# Patient Record
Sex: MALE | Race: WHITE | NOT HISPANIC OR LATINO | Employment: FULL TIME | URBAN - METROPOLITAN AREA
[De-identification: names, ages, dates, MRNs, and addresses within clinical notes are randomized per-mention and may not be internally consistent; named-entity substitution may affect disease eponyms.]

---

## 2017-03-24 ENCOUNTER — ANTICOAGULATION THERAPY VISIT (OUTPATIENT)
Dept: NURSING | Facility: CLINIC | Age: 37
End: 2017-03-24
Payer: COMMERCIAL

## 2017-03-24 DIAGNOSIS — I42.9 CARDIOMYOPATHY (H): ICD-10-CM

## 2017-03-24 DIAGNOSIS — Z79.01 LONG TERM CURRENT USE OF ANTICOAGULANT THERAPY: ICD-10-CM

## 2017-03-24 LAB — INR POINT OF CARE: 5 (ref 0.86–1.14)

## 2017-03-24 PROCEDURE — 99207 ZZC NO CHARGE NURSE ONLY: CPT

## 2017-03-24 PROCEDURE — 36416 COLLJ CAPILLARY BLOOD SPEC: CPT

## 2017-03-24 PROCEDURE — 85610 PROTHROMBIN TIME: CPT | Mod: QW

## 2017-03-24 RX ORDER — WARFARIN SODIUM 10 MG/1
10 TABLET ORAL DAILY
Qty: 90 TABLET | Refills: 0 | Status: SHIPPED | OUTPATIENT
Start: 2017-03-24 | End: 2017-07-25

## 2017-03-24 NOTE — MR AVS SNAPSHOT
Naman Alvarado   3/24/2017 8:00 AM   Anticoagulation Therapy Visit    Description:  36 year old male   Provider:  CHERELLE ANTI COAG   Department:  [facility]           INR as of 3/24/2017     Today's INR 5.0!      Anticoagulation Summary as of 3/24/2017     INR goal 2.0-3.0    Today's INR 5.0!    Full instructions 3/25: Hold; 3/26: 5 mg; Otherwise 10 mg every day    Next INR check 3/31/2017      Your next Anticoagulation Clinic appointment(s)     Mar 31, 2017 11:30 AM CDT   An[facility]agulation [facility] CHERELLE ANTI COAG   [facility]    [address] [address] [phone]              Contact Numbers     [facility]  Please call [phone] to cancel and/or reschedule your appointment   Please call [phone] with any problems or questions regarding your therapy.        March 2017 Details    Sun Mon Tue Wed Thu Fri Sat        1               2               3               4                 5               6               7               8               9               10               11                 12               13               14               15               16               17               18                 19               20               21               22               23               24      10 mg   See details      25      Hold           26      5 mg         27      10 mg         28      10 mg         29      10 mg         30      10 mg         31              Date Details   03/24 This INR check       Date of next INR:  3/31/2017         How to take your warfarin dose     To take:  5 mg Take 0.5 of a 10 mg tablet.    To take:  10 mg Take 1 of the 10 mg tablets.    Hold Do not take your warfarin dose. See the Details table to the right for additional instructions.

## 2017-03-24 NOTE — PROGRESS NOTES
ANTICOAGULATION FOLLOW-UP CLINIC VISIT    Patient Name:  Naman Alvarado  Date:  3/24/2017  Contact Type:  Face to Face    SUBJECTIVE:     Patient Findings     Positives No Problem Findings    Comments Pt. Has not had INR checked since August. Has been taking his warfain 10 mg daily.  Adjusted dose, made follow up appointment for 3/31/17.           OBJECTIVE    INR Protime   Date Value Ref Range Status   03/24/2017 5.0 (A) 0.86 - 1.14 Final       ASSESSMENT / PLAN  INR assessment SUPRA    Recheck INR In: 1 WEEK    INR Location Clinic      Anticoagulation Summary as of 3/24/2017     INR goal 2.0-3.0    Today's INR 5.0!    Maintenance plan 10 mg (10 mg x 1) every day    Full instructions 3/25: Hold; 3/26: 5 mg; Otherwise 10 mg every day    Weekly total 70 mg    Weekly max dose 70 mg    Plan last modified Natasha Marcus, RN (3/24/2017)    Next INR check 3/31/2017    Target end date       Anticoagulation Episode Summary     [facility]    Preferred lab     Send INR reminders to  [facility]    Comments       Anticoagulation Care Providers     Provider Role Specialty Phone number    Parth Cloud MD Responsible Internal Medicine [phone]            See the Encounter Report to view Anticoagulation Flowsheet and Dosing Calendar (Go to Encounters tab in chart review, and find the Anticoagulation Therapy Visit)    Dosage adjustment made based on physician directed care plan. Reviewed both bleeding and clotting signs and symptoms with patient this visit. Pt. has no further questions or concerns.  Will call with any changes to diet, medications, or missed doses at INR line [phone].  Gave refill of warfarin, pt. Reports he is out of medication.    Natasha Marcus, RN

## 2017-03-31 ENCOUNTER — ANTICOAGULATION THERAPY VISIT (OUTPATIENT)
Dept: NURSING | Facility: CLINIC | Age: 37
End: 2017-03-31
Payer: COMMERCIAL

## 2017-03-31 LAB — INR POINT OF CARE: 3 (ref 0.86–1.14)

## 2017-03-31 PROCEDURE — 99207 ZZC NO CHARGE NURSE ONLY: CPT

## 2017-03-31 PROCEDURE — 36416 COLLJ CAPILLARY BLOOD SPEC: CPT

## 2017-03-31 PROCEDURE — 85610 PROTHROMBIN TIME: CPT | Mod: QW

## 2017-03-31 NOTE — MR AVS SNAPSHOT
Naman DMITRY Alvarado   3/31/2017 11:30 AM   Anticoagulation Therapy Visit    Description:  36 year old male   Provider:  CHERELLE ANTI COAG   Department:  [facility]           INR as of 3/31/2017     Today's INR 3.0      Anticoagulation Summary as of 3/31/2017     INR goal 2.0-3.0    Today's INR 3.0    Full instructions 10 mg every day    Next INR check 4/14/2017      Your next Anticoagulation Clinic appointment(s)     Apr 14, 2017 11:30 AM CDT   Anticoagulation Visit with CHERELLE ANTI COAG   [facility]    [address] [address] [phone]              Contact Numbers     [facility]  Please call [phone] to cancel and/or reschedule your appointment   Please call [phone] with any problems or questions regarding your therapy.        March 2017 Details    Sun Mon Tue Wed Thu Fri Sat        1               2               3               4                 5               6               7               8               9               10               11                 12               13               14               15               16               17               18                 19               20               21               22               23               24               25                 26               27               28               29               30               31      10 mg   See details        Date Details   03/31 This INR check               How to take your warfarin dose     To take:  10 mg Take 1 of the 10 mg tablets.           April 2017 Details    Sun Mon Tue Wed Thu Fri Sat           1      10 mg           2      10 mg         3      10 mg         4      10 mg         5      10 mg         6      10 mg         7      10 mg         8      10 mg           9      10 mg         10      10 mg         11      10 mg         12      10 mg         13      10 mg         14            15                 16               17                18               19               20               21               22                 23               24               25               26               27               28               29                 30                      Date Details   No additional details    Date of next INR:  4/14/2017         How to take your warfarin dose     To take:  10 mg Take 1 of the 10 mg tablets.

## 2017-04-14 ENCOUNTER — ANTICOAGULATION THERAPY VISIT (OUTPATIENT)
Dept: NURSING | Facility: CLINIC | Age: 37
End: 2017-04-14
Payer: COMMERCIAL

## 2017-04-14 LAB — INR POINT OF CARE: 4.3 (ref 0.86–1.14)

## 2017-04-14 PROCEDURE — 99207 ZZC NO CHARGE NURSE ONLY: CPT

## 2017-04-14 PROCEDURE — 36416 COLLJ CAPILLARY BLOOD SPEC: CPT

## 2017-04-14 PROCEDURE — 85610 PROTHROMBIN TIME: CPT | Mod: QW

## 2017-04-14 NOTE — PROGRESS NOTES
ANTICOAGULATION FOLLOW-UP CLINIC VISIT    Patient Name:  Naman Alvarado  Date:  4/14/2017  Contact Type:  Face to Face    SUBJECTIVE:     Patient Findings     Positives Extra doses    Comments Accidentally took an extra dose on Tuesday.           OBJECTIVE    INR Protime   Date Value Ref Range Status   04/14/2017 4.3 (A) 0.86 - 1.14 Final       ASSESSMENT / PLAN  INR assessment SUPRA    Recheck INR In: 3 WEEKS    INR Location Clinic      Anticoagulation Summary as of 4/14/2017     INR goal 2.0-3.0    Today's INR 4.3!    Maintenance plan 10 mg (10 mg x 1) every day    Full instructions 4/14: Hold; Otherwise 10 mg every day    Weekly total 70 mg    Weekly max dose 70 mg    Plan last modified Natasha Marcus RN (3/24/2017)    Next INR check 5/5/2017    Target end date       Anticoagulation Episode Summary     [facility]    Preferred lab     Send INR reminders to  [facility]    Comments       Anticoagulation Care Providers     Provider Role Specialty Phone number    Parth Cloud MD Responsible Internal Medicine [phone]            See the Encounter Report to view Anticoagulation Flowsheet and Dosing Calendar (Go to Encounters tab in chart review, and find the Anticoagulation Therapy Visit)    Dosage adjustment made based on physician directed care plan. Reviewed both bleeding and clotting signs and symptoms with patient this visit. Pt. has no further questions or concerns.  Will call with any changes to diet, medications, or missed doses at INR line [phone].      Natasha Marcus, RN

## 2017-04-14 NOTE — MR AVS SNAPSHOT
Naman Alvarado   4/14/2017 11:30 AM   Anticoagulation Therapy Visit    Description:  36 year old male   Provider:  CHERELLE ANTI COAG   Department:  [facility]           INR as of 4/14/2017     Today's INR 4.3!      Anticoagulation Summary as of 4/14/2017     INR goal 2.0-3.0    Today's INR 4.3!    Full instructions 4/14: Hold; Otherwise 10 mg every day    Next INR check 5/5/2017      Your next Anticoagulation Clinic appointment(s)     May 05, 2017 11:30 AM CDT   Anticoagulation Visit with CHERELLE ANTI COAG   [facility]    [address] [place] [id number] [phone]              Contact Numbers     [facility]  Please call [phone] to cancel and/or reschedule your appointment   Please call [phone] with any problems or questions regarding your therapy.        April 2017 Details    Sun Mon Tue Wed Thu Fri Sat           1                 2               3               4               5               6               7               8                 9               10               11               12               13               14      Hold   See details      15      10 mg           16      10 mg         17      10 mg         18      10 mg         19      10 mg         20      10 mg         21      10 mg         22      10 mg           23      10 mg         24      10 mg         25      10 mg         26      10 mg         27      10 mg         28      10 mg         29      10 mg           30      10 mg                Date Details   04/14 This INR check               How to take your warfarin dose     To take:  10 mg Take 1 of the 10 mg tablets.    Hold Do not take your warfarin dose. See the Details table to the right for additional instructions.                May 2017 Details    Sun Mon Tue Wed Thu Fri Sat      1      10 mg         2      10 mg         3      10 mg         4      10 mg         5            6                 7               8                9               10               11               12               13                 14               15               16               17               18               19               20                 21               22               23               24               25               26               27                 28               29               30               31                   Date Details   No additional details    Date of next INR:  5/5/2017         How to take your warfarin dose     To take:  10 mg Take 1 of the 10 mg tablets.

## 2017-05-08 ENCOUNTER — TELEPHONE (OUTPATIENT)
Dept: NURSING | Facility: CLINIC | Age: 37
End: 2017-05-08

## 2017-05-08 NOTE — TELEPHONE ENCOUNTER
Left message for patient to return call to the INR clinic number at [phone].  Natasha Marcus RN

## 2017-05-16 NOTE — TELEPHONE ENCOUNTER
Message left for patient to call the INR Clinic # at [phone] to schedule an INR appointment.    Emilia Hernandez RN - BC

## 2017-05-24 NOTE — TELEPHONE ENCOUNTER
Message left for patient to call the INR Clinic # at [phone] to schedule an INR appointment.     Emilia Hernandez RN - BC

## 2017-07-25 ENCOUNTER — TELEPHONE (OUTPATIENT)
Dept: NURSING | Facility: CLINIC | Age: 37
End: 2017-07-25

## 2017-07-25 ENCOUNTER — ANTICOAGULATION THERAPY VISIT (OUTPATIENT)
Dept: NURSING | Facility: CLINIC | Age: 37
End: 2017-07-25
Payer: COMMERCIAL

## 2017-07-25 DIAGNOSIS — Z79.01 CHRONIC ANTICOAGULATION: ICD-10-CM

## 2017-07-25 DIAGNOSIS — Z79.01 LONG TERM CURRENT USE OF ANTICOAGULANT THERAPY: Primary | ICD-10-CM

## 2017-07-25 DIAGNOSIS — I82.512 CHRONIC DEEP VEIN THROMBOSIS (DVT) OF FEMORAL VEIN OF LEFT LOWER EXTREMITY (H): ICD-10-CM

## 2017-07-25 DIAGNOSIS — Z79.01 LONG TERM CURRENT USE OF ANTICOAGULANT THERAPY: ICD-10-CM

## 2017-07-25 DIAGNOSIS — I42.9 CARDIOMYOPATHY (H): ICD-10-CM

## 2017-07-25 PROBLEM — I82.519: Status: ACTIVE | Noted: 2017-07-25

## 2017-07-25 LAB — INR POINT OF CARE: 1.4 (ref 0.86–1.14)

## 2017-07-25 PROCEDURE — 99207 ZZC NO CHARGE NURSE ONLY: CPT

## 2017-07-25 PROCEDURE — 36416 COLLJ CAPILLARY BLOOD SPEC: CPT

## 2017-07-25 PROCEDURE — 85610 PROTHROMBIN TIME: CPT | Mod: QW

## 2017-07-25 RX ORDER — WARFARIN SODIUM 10 MG/1
10 TABLET ORAL DAILY
Qty: 90 TABLET | Refills: 0 | Status: SHIPPED | OUTPATIENT
Start: 2017-07-25 | End: 2017-12-15

## 2017-07-25 NOTE — PROGRESS NOTES
ANTICOAGULATION FOLLOW-UP CLINIC VISIT    Patient Name:  Naman Alvarado  Date:  7/25/2017  Contact Type:  Face to Face    SUBJECTIVE:     Patient Findings     Positives Missed doses, No Problem Findings           OBJECTIVE    INR Protime   Date Value Ref Range Status   07/25/2017 1.4 (A) 0.86 - 1.14 Final       ASSESSMENT / PLAN  INR assessment SUB    Recheck INR In: 1 WEEK    INR Location Clinic      Anticoagulation Summary as of 7/25/2017     INR goal 2.0-3.0    Today's INR 1.4!    Maintenance plan 10 mg (10 mg x 1) every day    Full instructions 10 mg every day    Weekly total 70 mg    Weekly max dose 70 mg    No change documented Emilia Hernandez, RN    Plan last modified Natasha Marcus RN (3/24/2017)    Next INR check 8/1/2017    Target end date       Anticoagulation Episode Summary     INR check location [facility]    Preferred lab     Send INR reminders to  [facility]    Comments       Anticoagulation Care Providers     Provider Role Specialty Phone number    Parth Cloud MD [facility] Internal Medicine [phone]            See the Encounter Report to view Anticoagulation Flowsheet and Dosing Calendar (Go to Encounters tab in chart review, and find the Anticoagulation Therapy Visit)    Dosage adjustment made based on physician directed care plan.    Emilia Hernandez, NIDA

## 2017-07-25 NOTE — MR AVS SNAPSHOT
Naman DMITRY Alvarado   7/25/2017 4:15 PM   Anticoagulation Therapy Visit    Description:  36 year old male   Provider:  CHERELLE ANTI COAG   Department:  [facility]           INR as of 7/25/2017     Today's INR       Anticoagulation Summary as of 7/25/2017     INR goal 2.0-3.0    Today's INR     Full instructions 10 mg every day    Next INR check 8/1/2017      Your next Anticoagulation Clinic appointment(s)     Jul 25, 2017  4:15 PM CDT   Anticoagulation Visit with CHERELLE ANTI COAG   [facility] ([facility]    [address] [address] [phone]            Aug 01, 2017  4:00 PM CDT   Anticoagulation Visit with CHERELLE ANTI COAG   [facility] ([facility]    [address] [address] [phone]              Contact Numbers     [facility]  Please call [phone] to cancel and/or reschedule your appointment   Please call [phone] with any problems or questions regarding your therapy.        July 2017 Details    Sun Mon Tue Wed u Fri Sat           1                 2               3               4               5               6               7               8                 9               10               11               12               13               14               15                 16               17               18               19               20               21               22                 23               24               25      10 mg   See details      26      10 mg         27      10 mg         28      10 mg         29      10 mg           30      10 mg         31      10 mg               Date Details   07/25 This INR check               How to take your warfarin dose     To take:  10 mg Take 1 of the 10 mg tablets.           August 2017 Details    Sun Mon Tue Wed Thu Fri Sat       1            2               3               4               5                 6               7               8                9               10               11               12                 13               14               15               16               17               18               19                 20               21               22               23               24               25               26                 27               28               29               30               31                  Date Details   No additional details    Date of next INR:  8/1/2017         How to take your warfarin dose     To take:  10 mg Take 1 of the 10 mg tablets.

## 2017-08-01 ENCOUNTER — ANTICOAGULATION THERAPY VISIT (OUTPATIENT)
Dept: NURSING | Facility: CLINIC | Age: 37
End: 2017-08-01
Payer: COMMERCIAL

## 2017-08-01 LAB — INR POINT OF CARE: 2.7 (ref 0.86–1.14)

## 2017-08-01 PROCEDURE — 36416 COLLJ CAPILLARY BLOOD SPEC: CPT

## 2017-08-01 PROCEDURE — 85610 PROTHROMBIN TIME: CPT | Mod: QW

## 2017-08-01 PROCEDURE — 99207 ZZC NO CHARGE NURSE ONLY: CPT

## 2017-08-01 NOTE — MR AVS SNAPSHOT
Naman Alvarado   8/1/2017 4:00 PM   Anticoagulation Therapy Visit    Description:  37 year old male   Provider:  CHERELLE ANTI COAG   Department:  [facility]           INR as of 8/1/2017     Today's INR 2.7      Anticoagulation Summary as of 8/1/2017     INR goal 2.0-3.0    Today's INR 2.7    Full instructions 10 mg every day    Next INR check 8/15/2017      Your next Anticoagulation Clinic appointment(s)     Aug 01, 2017  4:00 PM CDT   Anticoagulation Visit with  ANTI COAG   [facility] ([facility]    [address] [facility] [address] [phone]            Aug 15, 2017  4:15 PM CDT   Anticoagulation Visit with  ANTI COAG   [facility] ([facility]    [address] [address] [phone]              Contact Numbers     [facility]  Please call [phone] to cancel and/or reschedule your appointment   Please call [phone] with any problems or questions regarding your therapy.        August 2017 Details    Sun Mon Tue Wed Thu Fri Sat       1      10 mg   See details      2      10 mg         3      10 mg         4      10 mg         5      10 mg           6      10 mg         7      10 mg         8      10 mg         9      10 mg         10      10 mg         11      10 mg         12      10 mg           13      10 mg         14      10 mg         15            16               17               18               19                 20               21               22               23               24               25               26                 27               28               29               30               31                  Date Details   08/01 This INR check       Date of next INR:  8/15/2017         How to take your warfarin dose     To take:  10 mg Take 1 of the 10 mg tablets.

## 2017-08-01 NOTE — PROGRESS NOTES
ANTICOAGULATION FOLLOW-UP CLINIC VISIT    Patient Name:  Naman Alvarado  Date:  8/1/2017  Contact Type:  Face to Face    SUBJECTIVE:     Patient Findings     Positives No Problem Findings           OBJECTIVE    INR Protime   Date Value Ref Range Status   08/01/2017 2.7 (A) 0.86 - 1.14 Final       ASSESSMENT / PLAN  No question data found.  Anticoagulation Summary as of 8/1/2017     INR goal 2.0-3.0    Today's INR 2.7    Maintenance plan 10 mg (10 mg x 1) every day    Full instructions 10 mg every day    Weekly total 70 mg    Weekly max dose 70 mg    No change documented Emilia Hernandez, RN    Plan last modified Natasha Marcus RN (3/24/2017)    Next INR check 8/15/2017    Target end date       Anticoagulation Episode Summary     INR check location [facility]    Preferred lab     Send INR reminders to  [facility]    Comments       Anticoagulation Care Providers     Provider Role Specialty Phone number    Patrh Cloud MD Responsible Internal Medicine [phone]            See the Encounter Report to view Anticoagulation Flowsheet and Dosing Calendar (Go to Encounters tab in chart review, and find the Anticoagulation Therapy Visit)    Dosage adjustment made based on physician directed care plan.    Emilia Hernandez, NIDA

## 2017-08-15 ENCOUNTER — ANTICOAGULATION THERAPY VISIT (OUTPATIENT)
Dept: NURSING | Facility: CLINIC | Age: 37
End: 2017-08-15
Payer: COMMERCIAL

## 2017-08-15 LAB — INR POINT OF CARE: 1.7 (ref 0.86–1.14)

## 2017-08-15 PROCEDURE — 85610 PROTHROMBIN TIME: CPT | Mod: QW

## 2017-08-15 PROCEDURE — 99207 ZZC NO CHARGE NURSE ONLY: CPT

## 2017-08-15 PROCEDURE — 36416 COLLJ CAPILLARY BLOOD SPEC: CPT

## 2017-08-15 NOTE — PROGRESS NOTES
ANTICOAGULATION FOLLOW-UP CLINIC VISIT    Patient Name:  Naman Alvarado  Date:  8/15/2017  Contact Type:  Face to Face    SUBJECTIVE:        OBJECTIVE    INR Protime   Date Value Ref Range Status   08/15/2017 1.7 (A) 0.86 - 1.14 Final       ASSESSMENT / PLAN  No question data found.  Anticoagulation Summary as of 8/15/2017     INR goal 2.0-3.0    Today's INR 1.7!    Maintenance plan 10 mg (10 mg x 1) every day    Full instructions 8/15: 15 mg; Otherwise 10 mg every day    Weekly total 70 mg    Weekly max dose 70 mg    Plan last modified Natasha Marcus RN (3/24/2017)    Next INR check 8/29/2017    Target end date       Anticoagulation Episode Summary     INR check location [facility]    Preferred lab     Send INR reminders to  [facility]    Comments       Anticoagulation Care Providers     Provider Role Specialty Phone number    Parth Cloud MD [facility] Internal Medicine [phone]            See the Encounter Report to view Anticoagulation Flowsheet and Dosing Calendar (Go to Encounters tab in chart review, and find the Anticoagulation Therapy Visit)    Dosage adjustment made based on physician directed care plan.    Emilia Hernandez, RN

## 2017-08-15 NOTE — MR AVS SNAPSHOT
Namanvenkatesh Alvarado   8/15/2017 4:15 PM   Anticoagulation Therapy Visit    Description:  37 year old male   Provider:  CHERELLE ANTI COAG   Department:  [facility]           INR as of 8/15/2017     Today's INR 1.7!      Anticoagulation Summary as of 8/15/2017     INR goal 2.0-3.0    Today's INR 1.7!    Full instructions 8/15: 15 mg; Otherwise 10 mg every day    Next INR check 8/29/2017      Your next Anticoagulation Clinic appointment(s)     Aug 15, 2017  4:15 PM CDT   Anticoagulation Visit with  ANTI COAG   [facility]    [address] [facility] [address] [phone]            Aug 29, 2017  3:45 PM CDT   Anticoagulation Visit with  ANTI COAG   [facility]    [address] [facility] [address] [phone]              Contact Numbers     [facility]  Please call [phone] to cancel and/or reschedule your appointment   Please call [phone] with any problems or questions regarding your therapy.        August 2017 Details    Sun Mon Tue Wed Thu Fri Sat       1               2               3               4               5                 6               7               8               9               10               11               12                 13               14               15      15 mg   See details      16      10 mg         17      10 mg         18      10 mg         19      10 mg           20      10 mg         21      10 mg         22      10 mg         23      10 mg         24      10 mg         25      10 mg         26      10 mg           27      10 mg         28      10 mg         29            30               31                  Date Details   08/15 This INR check       Date of next INR:  8/29/2017         How to take your warfarin dose     To take:  10 mg Take 1 of the 10 mg tablets.    To take:  15 mg Take 1.5 of the 10 mg tablets.

## 2017-08-30 ENCOUNTER — TELEPHONE (OUTPATIENT)
Dept: INTERNAL MEDICINE | Facility: CLINIC | Age: 37
End: 2017-08-30

## 2017-08-30 NOTE — TELEPHONE ENCOUNTER
Message left for patient to call the INR Clinic # at [phone] to schedule INR appointment. He was a no show for his appointment on 8/29/17     Emilia Hernandez RN - BC

## 2017-08-30 NOTE — LETTER
[facility]  [address]  [place] [address]  [phone]  Dept: [phone]                  Naman Alvarado  [address] [place] [address]                Dear Naman,              Our records show that you are overdue for an INR, your last INR was done on 8/15/17.  We have tried to call you multiple times, but haven't been able to get through.  Please call [phone] to schedule an INR appointment or go to lab to have an INR drawn as soon as you are able.  Also please update your contact information with us if anything has changed.  Please let us know if you have any questions!!            Thank you,        [facility] INR clinic

## 2017-08-30 NOTE — LETTER
[facility] [place]  [address]  [place] [address] [phone]      November 15, 2017      Naman Alvarado  [address]  [place] [address]      Dear Naman Alvarado:    Medical Alert!  Regular follow-up care while on anti-coagulation medication is vital to your health because of your underlying health condition. Your medication can prevent strokes or clots from forming. It also can cause life-threatening bleeding complications IF it is not monitored on a regular basis.    After multiple attempts to contact you by phone and a previous letter we can no longer assume the risk to have you in our coumadin program or provide refills of your medications.    A copy of this letter will be provided to your primary physician and we ask that you please consider scheduling an appointment today by calling [phone] with Dr. Cloud. Your health is important to us and we would like to partner with you for a healthy future.    Sincerely,    Emilia Hernandez RN - LEONILA Parr Anti-coagulation Nurse   [facility]

## 2017-09-06 NOTE — TELEPHONE ENCOUNTER
Message left for patient to call the INR Clinic # at [phone] to schedule INR appointment.     Emilia Hernandez RN - BC

## 2017-09-14 NOTE — TELEPHONE ENCOUNTER
Message left for patient to call the INR Clinic # at [phone] to schedule INR appointment.      Emilia Hernandez RN - BC

## 2017-09-21 NOTE — TELEPHONE ENCOUNTER
Message left for patient to call the INR Clinic # at [phone] to schedule INR appointment.       Emilia Hernandez RN - BC

## 2017-10-02 NOTE — TELEPHONE ENCOUNTER
Message left for patient to call the INR Clinic # at [phone] to schedule INR appointment.       Emilia Hernandez RN - BC

## 2017-10-09 NOTE — TELEPHONE ENCOUNTER
Message left for patient to call the INR Clinic # at [phone] to schedule INR appointment.       Emilia Hernandez RN - BC

## 2017-10-16 NOTE — TELEPHONE ENCOUNTER
Pt has been called 6 times with no response.  Letter drafted and sent to pt.    Justin Rowland RN, BSN

## 2017-11-07 NOTE — TELEPHONE ENCOUNTER
Patient has been non compliant with INR testing. Letter has been sent to patient (10/16/17). Requesting to discharge patient from INR clinic with future follow ups to be with the provider.    Emilia Hernandez RN - BC

## 2017-11-15 ENCOUNTER — ANTICOAGULATION THERAPY VISIT (OUTPATIENT)
Dept: NURSING | Facility: CLINIC | Age: 37
End: 2017-11-15

## 2017-11-15 NOTE — TELEPHONE ENCOUNTER
TC - please print and mail patient letter dated 11/15.    Thank you,  Emilia Hernandez RN - BC

## 2017-12-15 ENCOUNTER — OFFICE VISIT (OUTPATIENT)
Dept: INTERNAL MEDICINE | Facility: CLINIC | Age: 37
End: 2017-12-15
Payer: COMMERCIAL

## 2017-12-15 VITALS
WEIGHT: 238 LBS | TEMPERATURE: 97 F | HEIGHT: 70 IN | DIASTOLIC BLOOD PRESSURE: 78 MMHG | SYSTOLIC BLOOD PRESSURE: 130 MMHG | OXYGEN SATURATION: 98 % | BODY MASS INDEX: 34.07 KG/M2 | HEART RATE: 84 BPM

## 2017-12-15 DIAGNOSIS — Z79.01 LONG TERM CURRENT USE OF ANTICOAGULANT THERAPY: ICD-10-CM

## 2017-12-15 DIAGNOSIS — Z13.6 CARDIOVASCULAR SCREENING; LDL GOAL LESS THAN 160: ICD-10-CM

## 2017-12-15 DIAGNOSIS — Z79.01 CHRONIC ANTICOAGULATION: ICD-10-CM

## 2017-12-15 DIAGNOSIS — Z23 NEED FOR PROPHYLACTIC VACCINATION AGAINST STREPTOCOCCUS PNEUMONIAE (PNEUMOCOCCUS): ICD-10-CM

## 2017-12-15 DIAGNOSIS — Z23 NEED FOR PROPHYLACTIC VACCINATION AND INOCULATION AGAINST INFLUENZA: ICD-10-CM

## 2017-12-15 DIAGNOSIS — I42.9 CARDIOMYOPATHY, UNSPECIFIED TYPE (H): Primary | ICD-10-CM

## 2017-12-15 LAB
ALT SERPL W P-5'-P-CCNC: 40 U/L (ref 0–70)
ANION GAP SERPL CALCULATED.3IONS-SCNC: 12 MMOL/L (ref 3–14)
BASOPHILS # BLD AUTO: 0 10E9/L (ref 0–0.2)
BASOPHILS NFR BLD AUTO: 0.4 %
BUN SERPL-MCNC: 17 MG/DL (ref 7–30)
CALCIUM SERPL-MCNC: 8.8 MG/DL (ref 8.5–10.1)
CHLORIDE SERPL-SCNC: 106 MMOL/L (ref 94–109)
CHOLEST SERPL-MCNC: 237 MG/DL
CO2 SERPL-SCNC: 21 MMOL/L (ref 20–32)
CREAT SERPL-MCNC: 0.96 MG/DL (ref 0.66–1.25)
DIFFERENTIAL METHOD BLD: ABNORMAL
EOSINOPHIL # BLD AUTO: 0.2 10E9/L (ref 0–0.7)
EOSINOPHIL NFR BLD AUTO: 4 %
ERYTHROCYTE [DISTWIDTH] IN BLOOD BY AUTOMATED COUNT: 13.8 % (ref 10–15)
GFR SERPL CREATININE-BSD FRML MDRD: 89 ML/MIN/1.7M2
GLUCOSE SERPL-MCNC: 95 MG/DL (ref 70–99)
HCT VFR BLD AUTO: 39.3 % (ref 40–53)
HDLC SERPL-MCNC: 52 MG/DL
HGB BLD-MCNC: 13.6 G/DL (ref 13.3–17.7)
INR PPP: 1.5 (ref 0.86–1.14)
LDLC SERPL CALC-MCNC: 167 MG/DL
LYMPHOCYTES # BLD AUTO: 1.3 10E9/L (ref 0.8–5.3)
LYMPHOCYTES NFR BLD AUTO: 23 %
MCH RBC QN AUTO: 31.8 PG (ref 26.5–33)
MCHC RBC AUTO-ENTMCNC: 34.6 G/DL (ref 31.5–36.5)
MCV RBC AUTO: 92 FL (ref 78–100)
MONOCYTES # BLD AUTO: 0.6 10E9/L (ref 0–1.3)
MONOCYTES NFR BLD AUTO: 10.4 %
NEUTROPHILS # BLD AUTO: 3.6 10E9/L (ref 1.6–8.3)
NEUTROPHILS NFR BLD AUTO: 62.2 %
NONHDLC SERPL-MCNC: 185 MG/DL
NT-PROBNP SERPL-MCNC: 83 PG/ML (ref 0–125)
PLATELET # BLD AUTO: 250 10E9/L (ref 150–450)
POTASSIUM SERPL-SCNC: 3.8 MMOL/L (ref 3.4–5.3)
RBC # BLD AUTO: 4.28 10E12/L (ref 4.4–5.9)
SODIUM SERPL-SCNC: 139 MMOL/L (ref 133–144)
TRIGL SERPL-MCNC: 88 MG/DL
WBC # BLD AUTO: 5.7 10E9/L (ref 4–11)

## 2017-12-15 PROCEDURE — 85610 PROTHROMBIN TIME: CPT | Performed by: INTERNAL MEDICINE

## 2017-12-15 PROCEDURE — 36415 COLL VENOUS BLD VENIPUNCTURE: CPT | Performed by: INTERNAL MEDICINE

## 2017-12-15 PROCEDURE — 80061 LIPID PANEL: CPT | Performed by: INTERNAL MEDICINE

## 2017-12-15 PROCEDURE — 80048 BASIC METABOLIC PNL TOTAL CA: CPT | Performed by: INTERNAL MEDICINE

## 2017-12-15 PROCEDURE — 85025 COMPLETE CBC W/AUTO DIFF WBC: CPT | Performed by: INTERNAL MEDICINE

## 2017-12-15 PROCEDURE — 84460 ALANINE AMINO (ALT) (SGPT): CPT | Performed by: INTERNAL MEDICINE

## 2017-12-15 PROCEDURE — 83880 ASSAY OF NATRIURETIC PEPTIDE: CPT | Performed by: INTERNAL MEDICINE

## 2017-12-15 PROCEDURE — 99214 OFFICE O/P EST MOD 30 MIN: CPT | Performed by: INTERNAL MEDICINE

## 2017-12-15 RX ORDER — WARFARIN SODIUM 10 MG/1
10 TABLET ORAL DAILY
Qty: 90 TABLET | Refills: 0 | Status: SHIPPED | OUTPATIENT
Start: 2017-12-15 | End: 2018-04-16

## 2017-12-15 ASSESSMENT — PAIN SCALES - GENERAL: PAINLEVEL: NO PAIN (0)

## 2017-12-15 NOTE — MR AVS SNAPSHOT
After Visit Summary   12/15/2017    Naman Alvarado    MRN: [id number]           Patient Information     Date Of Birth          1980        Visit Information        Provider Department      12/15/2017 12:10 PM Parth Cloud MD [facility]        Today's Diagnoses     Cardiomyopathy, unspecified type (H)    -  1    Need for prophylactic vaccination and inoculation against influenza        Need for prophylactic vaccination against Streptococcus pneumoniae (pneumococcus)        Long term current use of anticoagulant therapy        Chronic anticoagulation        CARDIOVASCULAR SCREENING; LDL GOAL LESS THAN 160          Care Instructions    -- I will follow up with you about lab results.     [facility]    If you have any questions regarding to your visit please contact your care team:     Team Pink:   Clinic Hours Telephone Number   Internal Medicine:  Dr. India Harrison NP       7am-7pm  Monday - Thursday   7am-5pm  Fridays  [phone]  (Appointment scheduling available 24/7)    Questions about your visit?  Team Line  [phone]   Urgent Care - [place] and [place] - 11am-9pm Monday-Friday Saturday-Sunday- 9am-5pm   [place] - 5pm-9pm Monday-Friday Saturday-Sunday- 9am-5pm  [phone] - [place]   [phone] - [place]       What options do I have for visits at the clinic other than the traditional office visit?  To expand how we care for you, many of our providers are utilizing electronic visits (e-visits) and telephone visits, when medically appropriate, for interactions with their patients rather than a visit in the clinic.   We also offer nurse visits for many medical concerns. Just like any other service, we will bill your insurance company for this type of visit based on time spent on the phone with your provider. Not all insurance companies cover these visits. Please check with your medical insurance if  "this type of visit is covered. You will be responsible for any charges that are not paid by your insurance.      E-visits via [email or web address]:  generally incur a $35.00 fee.  Telephone visits:  Time spent on the phone: *charged based on time that is spent on the phone in increments of 10 minutes. Estimated cost:   5-10 mins $30.00   11-20 mins. $59.00   21-30 mins. $85.00   Use [email or web address] (secure email communication and access to your chart) to send your primary care provider a message or make an appointment. Ask someone on your Team how to sign up for [employer].    For a Price Quote for your services, please call our Viewabill Line at [phone].    As always, Thank you for trusting us with your health care needs!    Sandhya Soliman MA            Follow-ups after your visit        Who to contact     If you have questions or need follow up information about today's clinic visit or your schedule please contact [facility] directly at [phone].  Normal or non-critical lab and imaging results will be communicated to you by [email or web address]hart, letter or phone within 4 business days after the clinic has received the results. If you do not hear from us within 7 days, please contact the clinic through [email or web address]hart or phone. If you have a critical or abnormal lab result, we will notify you by phone as soon as possible.  Submit refill requests through [address] or call your pharmacy and they will forward the refill request to us. Please allow 3 business days for your refill to be completed.          Additional Information About Your Visit        [facility] [address] Information     [facility] [address] lets you send messages to your doctor, view your test results, renew your prescriptions, schedule appointments and more. To sign up, go to [email or web address] . Click on \"Log in\" on the left side of the screen, which will take you to the Welcome page. Then click on \"Sign up Now\" on the right side of the page.     You will be asked to enter the access " "code listed below, as well as some personal information. Please follow the directions to create your username and password.     Your access code is: [email or web address]  Expires: 3/15/2018 12:42 PM     Your access code will  in 90 days. If you need help or a new code, please call your [facility] clinic or [phone].        Care EveryWhere ID     This is your Care EveryWhere ID. This could be used by other organizations to access your [facility] medical records  [id number]        Your Vitals Were     Pulse Temperature Height Pulse Oximetry BMI (Body Mass Index)       84 97  F (36.1  C) (Oral) 5' 10\" (1.778 m) 98% 34.15 kg/m2        Blood Pressure from Last 3 Encounters:   12/15/17 130/78   16 132/79   14 134/80    Weight from Last 3 Encounters:   12/15/17 238 lb (108 kg)   16 220 lb 12.8 oz (100.2 kg)   14 208 lb (94.3 kg)              We Performed the Following     ALT     BASIC METABOLIC PANEL     CBC with platelets differential     INR     Lipid panel reflex to direct LDL Fasting          Where to get your medicines      [id number] [place] - [address] AT [facility] & [place]  [address], [place] [address]     Phone:  [phone]     warfarin 10 MG tablet          Primary Care Provider Office Phone # Fax #    Parth Cloud [phone] [address] [facility] [address]        Equal Access to Services     [facility] : Hadii nacny payton hadsamuel Soshakir, waaxda luqadaha, qaybta kaalmada avril, ellen irene. So [facility] [phone].    ATENCIÓN: Si habla español, tiene a deluna disposición servicios gratuitos de asistencia lingüística. Llame al [phone].    We comply with applicable federal civil rights laws and [place] laws. We do not discriminate on the basis of race, color, national origin, age, disability, sex, sexual orientation, or gender " identity.            Thank you!     Thank you for choosing [facility]  for your care. Our goal is always to provide you with excellent care. Hearing back from our patients is one way we can continue to improve our services. Please take a few minutes to complete the written survey that you may receive in the mail after your visit with us. Thank you!             Your Updated Medication List - Protect others around you: Learn how to safely use, store and throw away your medicines at www.disposemymeds.org.          This list is accurate as of: 12/15/17 12:42 PM.  Always use your most recent med list.                   Brand Name Dispense Instructions for use Diagnosis    warfarin 10 MG tablet    COUMADIN    90 tablet    Take 1 tablet (10 mg) by mouth daily    Long term current use of anticoagulant therapy, Cardiomyopathy, unspecified type (H)

## 2017-12-15 NOTE — NURSING NOTE
"Chief Complaint   Patient presents with     Recheck Medication     Warfarin. Due for PHQ2       Initial /78  Pulse 84  Temp 97  F (36.1  C) (Oral)  Ht 5' 10\" (1.778 m)  Wt 238 lb (108 kg)  SpO2 98%  BMI 34.15 kg/m2 Estimated body mass index is 34.15 kg/(m^2) as calculated from the following:    Height as of this encounter: 5' 10\" (1.778 m).    Weight as of this encounter: 238 lb (108 kg).  Medication Reconciliation: complete     Pilar Rangel MA       "

## 2017-12-15 NOTE — LETTER
[facility]  [address] [place] [address]    December 27, 2017    Naman Alvarado  [address]  [place] [address]          Dear Naman,    We have tried to reach you by phone, but were unable to do so.    Enclosed is a copy of your results.     1. You need to increase your coumadin from 10 milligrams daily to 10 milligrams 4 days per week and 12.5 milligrams Monday, Wednesday, and Friday schedule.    2. Do you need 2.5 milligram tabs sent in?    3. Schedule an INR follow up (just the labwork is needed) in 2 weeks.    4. Further follow up dependent upon your compliance. I may re-enroll you in the INR clinic if you show willingness to show up.    Results for orders placed or performed in visit on 12/15/17   ALT   Result Value Ref Range    ALT 40 0 - 70 U/L   BASIC METABOLIC PANEL   Result Value Ref Range    Sodium 139 133 - 144 mmol/L    Potassium 3.8 3.4 - 5.3 mmol/L    Chloride 106 94 - 109 mmol/L    Carbon Dioxide 21 20 - 32 mmol/L    Anion Gap 12 3 - 14 mmol/L    Glucose 95 70 - 99 mg/dL    Urea Nitrogen 17 7 - 30 mg/dL    Creatinine 0.96 0.66 - 1.25 mg/dL    GFR Estimate 89 >60 mL/min/1.7m2    GFR Estimate If Black >90 >60 mL/min/1.7m2    Calcium 8.8 8.5 - 10.1 mg/dL   Lipid panel reflex to direct LDL Fasting   Result Value Ref Range    Cholesterol 237 (H) <200 mg/dL    Triglycerides 88 <150 mg/dL    HDL Cholesterol 52 >39 mg/dL    LDL Cholesterol Calculated 167 (H) <100 mg/dL    Non HDL Cholesterol 185 (H) <130 mg/dL   INR   Result Value Ref Range    INR 1.50 (H) 0.86 - 1.14   CBC with platelets differential   Result Value Ref Range    WBC 5.7 4.0 - 11.0 10e9/L    RBC Count 4.28 (L) 4.4 - 5.9 10e12/L    Hemoglobin 13.6 13.3 - 17.7 g/dL    Hematocrit 39.3 (L) 40.0 - 53.0 %    MCV 92 78 - 100 fl    MCH 31.8 26.5 - 33.0 pg    MCHC 34.6 31.5 - 36.5 g/dL    RDW 13.8 10.0 - 15.0 %    Platelet Count 250 150 - 450 10e9/L    Diff Method  Automated Method     % Neutrophils 62.2 %    % Lymphocytes 23.0 %    % Monocytes 10.4 %    % Eosinophils 4.0 %    % Basophils 0.4 %    Absolute Neutrophil 3.6 1.6 - 8.3 10e9/L    Absolute Lymphocytes 1.3 0.8 - 5.3 10e9/L    Absolute Monocytes 0.6 0.0 - 1.3 10e9/L    Absolute Eosinophils 0.2 0.0 - 0.7 10e9/L    Absolute Basophils 0.0 0.0 - 0.2 10e9/L   BNP-N terminal pro   Result Value Ref Range    N-Terminal Pro Bnp 83 0 - 125 pg/mL     If you have any questions or concerns, please call myself or my nurse at [phone].    Sincerely,        Parth Cloud MD/lis

## 2017-12-15 NOTE — PROGRESS NOTES
SUBJECTIVE:   Naman Alvarado is a 37 year old male who presents to clinic today for the following health issues:    Mr. Naman Alvarado is a patient with a strong streak of noncompliant behavior. Due to repeated no show appointments he was eventually discontinued from the inr clinic and finally his coumadin was denied. My last office visit with this patient was close to 2 years ago.    DVT-- Patient is on Coumadin 10 MG. Patient states that he has been trying to keep up with follow up visits in his INR clinic. He notes that following up with the INR clinic has been difficult because he sometimes works shifts that can be either during the day or at night which makes it hard for him to go the the INR clinic during their open hours on particular days.  He states that his INR has not been stable in the last couple of years. He reports  that he was told in 2003 due to the severity of his deep vein thrombosis [ he basically clotted off his entire lower half of his body from the inferior vena cava on downwards ] that he would be on Coumadin for the rest of his life.     He also was diagnosed with an idiopathic dilated cardiomyopathy in 2009 with normal   Coronary arteries and he had follow up with cardiology  For a time but has long since stopped seeing them. He has a well compensated cardiomyopathy which is for all intents and purposes, asymptomatic.    Problem list and histories reviewed & adjusted, as indicated.  Additional history: as documented    Patient Active Problem List   Diagnosis     MEDICAL HISTORY OF -     CARDIOVASCULAR SCREENING; LDL GOAL LESS THAN 160     Chronic anticoagulation     Long term current use of anticoagulant therapy     Poor compliance with medication     Chronic deep vein thrombosis (DVT) of femoral vein of left lower extremity (H)     Cardiomyopathy, unspecified type (H)     Past Surgical History:   Procedure Laterality Date     SURGICAL HISTORY OF -   2003    [facility]       Social History   Substance Use Topics     Smoking status: Former Smoker     Years: 4.00     Quit date: 1/1/2003     Smokeless tobacco: Never Used      Comment: 1 pack per week     Alcohol use Yes      Comment: OCCASIONALLY     Family History   Problem Relation Age of Onset     Hypertension Mother      Neurologic Disorder Father      Alzheimer Disease Maternal Grandmother      HEART DISEASE Paternal Grandfather          Current Outpatient Prescriptions   Medication Sig Dispense Refill     warfarin (COUMADIN) 10 MG tablet Take 1 tablet (10 mg) by mouth daily 90 tablet 0     [DISCONTINUED] warfarin (COUMADIN) 10 MG tablet Take 1 tablet (10 mg) by mouth daily 90 tablet 0     Labs reviewed in EPIC      Reviewed and updated as needed this visit by clinical staffTobacco  Allergies  Meds  Med Hx  Surg Hx  Fam Hx  Soc Hx      Reviewed and updated as needed this visit by Provider         ROS:  Constitutional, HEENT, cardiovascular, pulmonary, GI, , musculoskeletal, neuro, skin, endocrine and psych systems are negative, except as otherwise noted.    This document serves as a record of the services and decisions personally performed and made by Parth Cloud MD. It was created on their behalf by Rita Lincoln, a trained medical scribe. The creation of this document is based the provider's statements to the medical scribe.  Rita Lincoln  December 15, 2017 12:17 PM    OBJECTIVE:   /78  Pulse 84  Temp 97  F (36.1  C) (Oral)  Ht 1.778 m (5' 10\")  Wt 108 kg (238 lb)  SpO2 98%  BMI 34.15 kg/m2  Body mass index is 34.15 kg/(m^2).  GENERAL: healthy, alert and no distress  EYES: Eyes grossly normal to inspection, EOMI, conjunctivae and sclerae normal  SKIN: no suspicious lesions or rashes to visible skin   NEURO: Mentation intact and speech normal  PSYCH: mentation appears normal, affect normal/bright  HEART -  regular rates and rhythm, normal S1 S2, no S3 or S4 and no cardiac murmur "   LUNGS clear to auscultation bilateral      Diagnostic Test Results:  Results for orders placed or performed in visit on 08/15/17   INR point of care   Result Value Ref Range    INR Protime 1.7 (A) 0.86 - 1.14       ASSESSMENT/PLAN:   (I42.9) Cardiomyopathy, unspecified type (H)  (primary encounter diagnosis)  Comment: we reviewed in detail his case. He declines any cardiology  Or echocardiograms, etc. He doesn't see the need. We agreed that , at the very least he needs to see me for annual office visits and I sternly emphasized that he needs occupational therapy be in touch with me via [employer] and he was signed up today. I agreed to  try to adjust patient with INR and if he stays in follow up I will work with him. There may be a time where E-visits will be necessary   Plan: ALT, BASIC METABOLIC PANEL, warfarin (COUMADIN)        10 MG tablet, INR, CBC with platelets         differential        N-Terminal Pro beta natriuretic peptide     (Z23) Need for prophylactic vaccination and inoculation against influenza  Comment: declines   Plan:     (Z23) Need for prophylactic vaccination against Streptococcus pneumoniae (pneumococcus)  Comment: declines   Plan: he will need this at the very least by the age of 67    (Z79.01) Long term current use of anticoagulant therapy  Comment: as detailed above   Plan: warfarin (COUMADIN) 10 MG tablet, INR, CBC with        platelets differential            (Z79.01) Chronic anticoagulation  Comment: as detailed above   Plan: INR, CBC with platelets differential            (Z13.6) CARDIOVASCULAR SCREENING; LDL GOAL LESS THAN 160  Comment: routine screening   Plan: Lipid panel reflex to direct LDL Fasting        Follow up as indicated on results       Patient Instructions     -- I will follow up with you about lab results.     [facility]    If you have any questions regarding to your visit please contact your care team:     Team Pink:   Clinic Hours Telephone Number   Internal  Medicine:  Dr. India Harrison, NP       7am-7pm  Monday - Thursday   7am-5pm  Fridays  [phone]  (Appointment scheduling available 24/7)    Questions about your visit?  Team Line  [phone]   [facility] [place] [facility] - 11am-9pm Monday-Friday Saturday-Sunday- 9am-5pm   [place] - 5pm-9pm Monday-Friday Saturday-Sunday- 9am-5pm  [phone] - [place]   [phone] - [place]       What options do I have for visits at the clinic other than the traditional office visit?  To expand how we care for you, many of our providers are utilizing electronic visits (e-visits) and telephone visits, when medically appropriate, for interactions with their patients rather than a visit in the clinic.   We also offer nurse visits for many medical concerns. Just like any other service, we will bill your insurance company for this type of visit based on time spent on the phone with your provider. Not all insurance companies cover these visits. Please check with your medical insurance if this type of visit is covered. You will be responsible for any charges that are not paid by your insurance.      E-visits via [employer]:  generally incur a $35.00 fee.  Telephone visits:  Time spent on the phone: *charged based on time that is spent on the phone in increments of 10 minutes. Estimated cost:   5-10 mins $30.00   11-20 mins. $59.00   21-30 mins. $85.00   Use [employer]iat (secure email communication and access to your chart) to send your primary care provider a message or make an appointment. Ask someone on your Team how to sign up for [employer].    For a Price Quote for your services, please call our Consumer Price Line at [phone].    As always, Thank you for trusting us with your health care needs!    Sandhya Soliman MA        The information in this document, created by the medical scribe for me, accurately reflects the services I personally performed and the  decisions made by me. I have reviewed and approved this document for accuracy.   MD Parth Hines MD  [facility]

## 2017-12-15 NOTE — PATIENT INSTRUCTIONS
-- I will follow up with you about lab results.     [facility]    If you have any questions regarding to your visit please contact your care team:     Team Pink:   Clinic Hours Telephone Number   Internal Medicine:  Dr. India Harrison, NP       7am-7pm  Monday - Thursday   7am-5pm  Fridays  [phone]  (Appointment scheduling available 24/7)    Questions about your visit?  Team Line  [phone]   Ur[facility] - [place] [facility] - 11am-9pm Monday-Friday Saturday-Sunday- 9am-5pm   [place] - 5pm-9pm Monday-Friday Saturday-Sunday- 9am-5pm  [phone] - [place]   [phone] - [place]       What options do I have for visits at the clinic other than the traditional office visit?  To expand how we care for you, many of our providers are utilizing electronic visits (e-visits) and telephone visits, when medically appropriate, for interactions with their patients rather than a visit in the clinic.   We also offer nurse visits for many medical concerns. Just like any other service, we will bill your insurance company for this type of visit based on time spent on the phone with your provider. Not all insurance companies cover these visits. Please check with your medical insurance if this type of visit is covered. You will be responsible for any charges that are not paid by your insurance.      E-visits via [employer]:  generally incur a $35.00 fee.  Telephone visits:  Time spent on the phone: *charged based on time that is spent on the phone in increments of 10 minutes. Estimated cost:   5-10 mins $30.00   11-20 mins. $59.00   21-30 mins. $85.00   Use [email or web address] (secure email communication and access to your chart) to send your primary care provider a message or make an appointment. Ask someone on your Team how to sign up for [employer].    For a Price Quote for your services, please call our Consumer Price Line at [phone].    As always, Thank you  for trusting us with your health care needs!    Sandhya Soliman MA

## 2017-12-18 NOTE — PROGRESS NOTES
Normal heart failure test.  Normal kidney function. Normal electrolytes. Normal liver blood test. Cholesterol is quite elevated.  I'll have Dr. Cloud get in touch with you when he is back in the office at the end of the week.   Dr. Wan

## 2018-01-04 ENCOUNTER — TELEPHONE (OUTPATIENT)
Dept: INTERNAL MEDICINE | Facility: CLINIC | Age: 38
End: 2018-01-04

## 2018-01-04 NOTE — TELEPHONE ENCOUNTER
Patient was scheduled on the INR schedule for 12:45pm on 1/5/18. Patient is no longer followed by [facility] and needs to follow up with Dr. Cloud for INR/Warfarin management. Scheduled patient with Dr. Cloud for 12:10pm on 1/5/18. Left VM advising patient to call back to RN hotline regarding the need to change his appointment.    Emilia Hernandez RN - BC

## 2018-01-05 ENCOUNTER — OFFICE VISIT (OUTPATIENT)
Dept: INTERNAL MEDICINE | Facility: CLINIC | Age: 38
End: 2018-01-05
Payer: COMMERCIAL

## 2018-01-05 VITALS
SYSTOLIC BLOOD PRESSURE: 130 MMHG | RESPIRATION RATE: 18 BRPM | OXYGEN SATURATION: 98 % | HEIGHT: 70 IN | DIASTOLIC BLOOD PRESSURE: 88 MMHG | WEIGHT: 238.2 LBS | HEART RATE: 81 BPM | BODY MASS INDEX: 34.1 KG/M2 | TEMPERATURE: 96.5 F

## 2018-01-05 DIAGNOSIS — I82.512 CHRONIC DEEP VEIN THROMBOSIS (DVT) OF FEMORAL VEIN OF LEFT LOWER EXTREMITY (H): ICD-10-CM

## 2018-01-05 DIAGNOSIS — Z79.01 LONG TERM CURRENT USE OF ANTICOAGULANT THERAPY: Primary | ICD-10-CM

## 2018-01-05 LAB — INR PPP: 7.6 (ref 0.86–1.14)

## 2018-01-05 PROCEDURE — 36416 COLLJ CAPILLARY BLOOD SPEC: CPT | Performed by: INTERNAL MEDICINE

## 2018-01-05 PROCEDURE — 85610 PROTHROMBIN TIME: CPT | Performed by: INTERNAL MEDICINE

## 2018-01-05 PROCEDURE — 99213 OFFICE O/P EST LOW 20 MIN: CPT | Performed by: INTERNAL MEDICINE

## 2018-01-05 RX ORDER — WARFARIN SODIUM 2.5 MG/1
2.5 TABLET ORAL
Qty: 30 TABLET | Refills: 3 | Status: SHIPPED | OUTPATIENT
Start: 2018-01-05 | End: 2019-07-09 | Stop reason: ALTCHOICE

## 2018-01-05 ASSESSMENT — PAIN SCALES - GENERAL: PAINLEVEL: NO PAIN (0)

## 2018-01-05 NOTE — PROGRESS NOTES
"  SUBJECTIVE:   Naman Alvarado is a 37 year old male who presents to clinic today for the following health issues:    SUBJECTIVE:  Naman Alvarado, a 37 year old male scheduled an appointment to discuss the following issues:     Long term current use of anticoagulant therapy  Chronic deep vein thrombosis (DVT) of femoral vein of left lower extremity (H)    Medical, social, surgical, and family histories reviewed.    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          ROS: 10 point ROS of systems including Constitutional, Eyes, Respiratory, Cardiovascular, Gastroenterology, Genitourinary, Integumentary, Muscularskeletal, Psychiatric were all negative except for pertinent positives noted in my HPI.    OBJECTIVE:  /88 (BP Location: Left arm, Cuff Size: Adult Regular)  Pulse 81  Temp 96.5  F (35.8  C) (Oral)  Resp 18  Ht 5' 10\" (1.778 m)  Wt 238 lb 3.2 oz (108 kg)  SpO2 98%  BMI 34.18 kg/m2  EXAM:  GENERAL APPEARANCE: healthy, alert and no distress  EYES: EOMI,  PERRL  HENT: ear canals and TM's normal and nose and mouth without ulcers or lesions  PSYCH: mentation appears normal and affect normal/bright    ASSESSMENT/PLAN:  (Z79.01) Long term current use of anticoagulant therapy  (primary encounter " diagnosis)  Comment: I saw Mr. Naman Alvarado today out of a desire to get his program of care set up. Due to some history of noncompliant behavior with  Inr clinic he'd been fired from the clinic. His appointment he thought was today at 12:45 when in point of fact that appointment with the inr clinic had been actually cancelled and so the INR / coumadin monitoring nurse placed patient onto my schedule. The patient had , as he says , not known of this situation. I Saturday down with Naman and did my professional best to establish our common goals of therapy. This patient had a history of a severe venous thromboembolic disease event and life long coumadin is planned. 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 Would he be a candidate for The [employer] INRatio  2 PT/INR Monitor ? We are open as a [facility] many more hours then just the inr clinic and for example could he come and have  blood draws by 6:30-7 and this could be at other sites and we can respond to these INRs by the following day ? I had a face to face encounter with Naman as well as with Emilia the the inr clinic RN today and we agreed to work  together to find a solution . For today he had returned for a recheck INR which was 7.6 ! This was a huge upwards jump from the last INR which was sub-therapeutic range at 1.5 and I had increased at that time his coumadin from 10 milligrams 7 days per week to a plan of 12.5 milligrams Monday, Wednesday, and Friday schedule and 10 milligrams the other days. Patient states he did not have 2.5 milligram tabs and so he had chosen to break his 10's into half doses and took these 2 days per week, and acknowledged that he may have double dosed on at least one day. This is a troubling development ! Cut his dose back to nothing more then 10 milligrams a day and recheck INR in 3-7 days . Cautioned about possible  easy bruising and risks reviewed antibody bleeding.  Plan: warfarin (COUMADIN) 2.5 MG tablet, INR           (I82.512) Chronic deep vein thrombosis (DVT) of femoral vein of left lower extremity (H)  Comment: 2.5 milligram tabs provided for potential future needs  Plan: warfarin (COUMADIN) 2.5 MG tablet, INR

## 2018-01-05 NOTE — NURSING NOTE
"Chief Complaint   Patient presents with     Other'       Initial /88 (BP Location: Left arm, Cuff Size: Adult Regular)  Pulse 81  Temp 96.5  F (35.8  C) (Oral)  Resp 18  Ht 5' 10\" (1.778 m)  Wt 238 lb 3.2 oz (108 kg)  SpO2 98%  BMI 34.18 kg/m2 Estimated body mass index is 34.18 kg/(m^2) as calculated from the following:    Height as of this encounter: 5' 10\" (1.778 m).    Weight as of this encounter: 238 lb 3.2 oz (108 kg).  Medication Reconciliation: complete       Key Ortiz CMA    "

## 2018-01-05 NOTE — MR AVS SNAPSHOT
"              After Visit Summary   1/5/2018    Naman Alvarado    MRN: [id number]           Patient Information     Date Of Birth          1980        Visit Information        Provider Department      1/5/2018 12:10 PM Parth Cloud MD [facility]        Today's Diagnoses     Long term current use of anticoagulant therapy    -  1    Chronic deep vein thrombosis (DVT) of femoral vein of left lower extremity (H)           Follow-ups after your visit        Who to contact     If you have questions or need follow up information about today's clinic visit or your schedule please contact [facility] directly at [phone].  Normal or non-critical lab and imaging results will be communicated to you by [employer], letter or phone within 4 business days after the clinic has received the results. If you do not hear from us within 7 days, please contact the clinic through [employer] or phone. If you have a critical or abnormal lab result, we will notify you by phone as soon as possible.  Submit refill requests through [employer] or call your pharmacy and they will forward the refill request to us. Please allow 3 business days for your refill to be completed.          Additional Information About Your Visit        MyChart Information     [employer] gives you secure access to your electronic health record. If you see a primary care provider, you can also send messages to your care team and make appointments. If you have questions, please call your primary care clinic.  If you do not have a primary care provider, please call [phone] and they will assist you.        Care EveryWhere ID     This is your Care EveryWhere ID. This could be used by other organizations to access your [facility] medical records  [id number]        Your Vitals Were     Pulse Temperature Respirations Height Pulse Oximetry BMI (Body Mass Index)    81 96.5  F (35.8  C) (Oral) 18 5' 10\" (1.778 m) 98% 34.18 kg/m2       Blood Pressure " from Last 3 Encounters:   01/05/18 130/88   12/15/17 130/78   02/04/16 132/79    Weight from Last 3 Encounters:   01/05/18 238 lb 3.2 oz (108 kg)   12/15/17 238 lb (108 kg)   02/04/16 220 lb 12.8 oz (100.2 kg)              We Performed the Following     INR          Today's Medication Changes          These changes are accurate as of: 1/5/18 11:59 PM.  If you have any questions, ask your nurse or doctor.               These medicines have changed or have updated prescriptions.        Dose/Directions    * warfarin 10 MG tablet   Commonly known as:  COUMADIN   This may have changed:  Another medication with the same name was added. Make sure you understand how and when to take each.   Used for:  Long term current use of anticoagulant therapy, Cardiomyopathy, unspecified type (H)   Changed by:  Parth Cloud MD        Dose:  10 mg   Take 1 tablet (10 mg) by mouth daily   Quantity:  90 tablet   Refills:  0       * warfarin 2.5 MG tablet   Commonly known as:  COUMADIN   This may have changed:  You were already taking a medication with the same name, and this prescription was added. Make sure you understand how and when to take each.   Used for:  Long term current use of anticoagulant therapy, Chronic deep vein thrombosis (DVT) of femoral vein of left lower extremity (H)   Changed by:  Parth Cloud MD        Dose:  2.5 mg   Take 1 tablet (2.5 mg) by mouth Every Mon, Wed, Fri Morning To accompany coumadin doses with 10 milligram tabs to = 12.5 milligrams Monday, Wednesday, and Friday schedule and 10 milligrams other days   Quantity:  30 tablet   Refills:  3       * Notice:  This list has 2 medication(s) that are the same as other medications prescribed for you. Read the directions carefully, and ask your doctor or other care provider to review them with you.         Where to get your medicines      Some of these will need a paper prescription and others can be bought over the counter.  Ask your nurse if you have  questions.     Bring a paper prescription for each of these medications     warfarin 2.5 MG tablet                Primary Care Provider Office Phone # Fax #    Parth Cloud [phone] [address] [facility] [address]        Equal Access to Services     DIANA CLIFFORD : Hadii nancy ku hadasho Soomaali, waaxda luqadaha, qaybta kaalmada adeegyada, waxdeena bower haylyleandrew kerr francisco jbrittni irene. So [facility] [phone].    ATENCIÓN: Si habla español, tiene a deluna disposición servicios gratuitos de asistencia lingüística. Llame al [phone].    We comply with applicable federal civil rights laws and [place] laws. We do not discriminate on the basis of race, color, national origin, age, disability, sex, sexual orientation, or gender identity.            Thank you!     Thank you for choosing [facility]  for your care. Our goal is always to provide you with excellent care. Hearing back from our patients is one way we can continue to improve our services. Please take a few minutes to complete the written survey that you may receive in the mail after your visit with us. Thank you!             Your Updated Medication List - Protect others around you: Learn how to safely use, store and throw away your medicines at www.disposemymeds.org.          This list is accurate as of: 1/5/18 11:59 PM.  Always use your most recent med list.                   Brand Name Dispense Instructions for use Diagnosis    * warfarin 10 MG tablet    COUMADIN    90 tablet    Take 1 tablet (10 mg) by mouth daily    Long term current use of anticoagulant therapy, Cardiomyopathy, unspecified type (H)       * warfarin 2.5 MG tablet    COUMADIN    30 tablet    Take 1 tablet (2.5 mg) by mouth Every Mon, Wed, Fri Morning To accompany coumadin doses with 10 milligram tabs to = 12.5 milligrams Monday, Wednesday, and Friday schedule and 10 milligrams other days    Long term current use of anticoagulant therapy, Chronic deep  vein thrombosis (DVT) of femoral vein of left lower extremity (H)       * Notice:  This list has 2 medication(s) that are the same as other medications prescribed for you. Read the directions carefully, and ask your doctor or other care provider to review them with you.

## 2018-01-05 NOTE — TELEPHONE ENCOUNTER
Detailed message left on patient's VM advising him that his INR appointment has been rescheduled as he needs to see provider for this.  Verified appointment date and time.  Advised him via VM to call scheduling number to reschedule if appointment today with Dr. Cloud at 12:10PM does not work out  Scheduling number given    Andrea Meek RN

## 2018-01-10 ENCOUNTER — TELEPHONE (OUTPATIENT)
Dept: INTERNAL MEDICINE | Facility: CLINIC | Age: 38
End: 2018-01-10

## 2018-01-10 DIAGNOSIS — Z79.01 CHRONIC ANTICOAGULATION: Primary | ICD-10-CM

## 2018-01-10 DIAGNOSIS — I82.512 CHRONIC DEEP VEIN THROMBOSIS (DVT) OF FEMORAL VEIN OF LEFT LOWER EXTREMITY (H): ICD-10-CM

## 2018-01-10 NOTE — TELEPHONE ENCOUNTER
Patient has been discharged from the [facility] for noncompliance. Per result note by Dr. Cloud on 12/15/17:  1. Patient needs to increase his coumadin from 10 milligrams daily to 10 milligrams 4 days per week and 12.5 milligrams Monday, Wednesday, and Friday schedule   2. See if patient needs 2.5 milligram tabs sent in and if yes, send in 30 pills with refills times 2  3. Schedule an INR follow up [ just the labwork is needed ] in 2 weeks  4. Further follow up dependent upon patient compliance. I may re-enroll patient in inr clinic if he shows willingness to show up.  Multiple attempts were made to contact patient via phone with no success. Letter was sent to patient with follow up instructions. Patient is currently on the [facility] schedule to be seen on 1/12/18. Per note above INR's are being monitored by PCP. Due to discharge from [facility] would need a new referral before he can be seen in [facility].  Emilia Hernandez RN - BC

## 2018-01-12 ENCOUNTER — ANTICOAGULATION THERAPY VISIT (OUTPATIENT)
Dept: NURSING | Facility: CLINIC | Age: 38
End: 2018-01-12
Payer: COMMERCIAL

## 2018-01-12 DIAGNOSIS — Z79.01 LONG TERM CURRENT USE OF ANTICOAGULANT THERAPY: ICD-10-CM

## 2018-01-12 DIAGNOSIS — I82.409 DEEP VEIN THROMBOSIS (DVT) (H): ICD-10-CM

## 2018-01-12 LAB — INR POINT OF CARE: 2.4 (ref 0.86–1.14)

## 2018-01-12 PROCEDURE — 36416 COLLJ CAPILLARY BLOOD SPEC: CPT

## 2018-01-12 PROCEDURE — 85610 PROTHROMBIN TIME: CPT | Mod: QW

## 2018-01-12 PROCEDURE — 99207 ZZC NO CHARGE NURSE ONLY: CPT

## 2018-01-12 NOTE — PROGRESS NOTES
ANTICOAGULATION FOLLOW-UP CLINIC VISIT    Patient Name:  Naman Alvarado  Date:  1/12/2018  Contact Type:  Face to Face    SUBJECTIVE:     Patient Findings     Positives No Problem Findings           OBJECTIVE    INR Protime   Date Value Ref Range Status   01/12/2018 2.4 (A) 0.86 - 1.14 Final       ASSESSMENT / PLAN  INR assessment THER    Recheck INR In: 2 WEEKS    INR Location Clinic      Anticoagulation Summary as of 1/12/2018     INR goal 2.0-3.0   Today's INR 2.4   Maintenance plan 10 mg (10 mg x 1) every day   Full instructions 10 mg every day   Weekly total 70 mg   Plan last modified Emilia Hernandez, RN (1/12/2018)   Next INR check 1/25/2018   Target end date Indefinite    Indications   Long term current use of anticoagulant therapy [Z79.01]  Deep vein thrombosis (DVT) (H) [I82.409] [I82.409]         Anticoagulation Episode Summary     INR check location     Preferred lab     Send INR reminders to [facility]    Comments       Anticoagulation Care Providers     Provider Role Specialty Phone number    Parth Cloud MD [facility] [phone]            See the Encounter Report to view Anticoagulation Flowsheet and Dosing Calendar (Go to Encounters tab in chart review, and find the Anticoagulation Therapy Visit)    Dosage adjustment made based on physician directed care plan.    Emilia Hernandez, NIDA

## 2018-01-12 NOTE — MR AVS SNAPSHOT
Naman DMITRY Alvarado   1/12/2018 12:45 PM   Anticoagulation Therapy Visit    Description:  37 year old male   Provider:  CHERELLE ANTI COAG   Department:  [facility]           INR as of 1/12/2018     Today's INR 2.4      Anticoagulation Summary as of 1/12/2018     INR goal 2.0-3.0   Today's INR 2.4   Full instructions 10 mg every day   Next INR check 1/25/2018    Indications   Long term current use of anticoagulant therapy [Z79.01]  Deep vein thrombosis (DVT) (H) [I82.409] [I82.409]         Your next Anticoagulation Clinic appointment(s)     Jan 25, 2018  4:00 PM CST   Anticoagulation Visit with CHERELLE ANTI COACLINTON   [facility] ([facility]    [address] [facility] [address] [phone]              Contact Numbers     [facility]  Please call [phone] to cancel and/or reschedule your appointment   Please call [phone] with any problems or questions regarding your therapy.        January 2018 Details    Sun Mon Tue Wed Thu Fri Sat      1               2               3               4               5               6                 7               8               9               10               11               12      10 mg   See details      13      10 mg           14      10 mg         15      10 mg         16      10 mg         17      10 mg         18      10 mg         19      10 mg         20      10 mg           21      10 mg         22      10 mg         23      10 mg         24      10 mg         25            26               27                 28               29               30               31                   Date Details   01/12 This INR check       Date of next INR:  1/25/2018         How to take your warfarin dose     To take:  10 mg Take 1 of the 10 mg tablets.

## 2018-01-25 ENCOUNTER — ANTICOAGULATION THERAPY VISIT (OUTPATIENT)
Dept: NURSING | Facility: CLINIC | Age: 38
End: 2018-01-25
Payer: COMMERCIAL

## 2018-01-25 DIAGNOSIS — I82.409 DEEP VEIN THROMBOSIS (DVT) (H): ICD-10-CM

## 2018-01-25 DIAGNOSIS — Z79.01 LONG TERM CURRENT USE OF ANTICOAGULANT THERAPY: ICD-10-CM

## 2018-01-25 LAB — INR POINT OF CARE: 2.1 (ref 0.86–1.14)

## 2018-01-25 PROCEDURE — 36416 COLLJ CAPILLARY BLOOD SPEC: CPT

## 2018-01-25 PROCEDURE — 99207 ZZC NO CHARGE NURSE ONLY: CPT

## 2018-01-25 PROCEDURE — 85610 PROTHROMBIN TIME: CPT | Mod: QW

## 2018-01-25 NOTE — PROGRESS NOTES
ANTICOAGULATION FOLLOW-UP CLINIC VISIT    Patient Name:  Naman Alvarado  Date:  1/25/2018  Contact Type:  Face to Face    SUBJECTIVE:     Patient Findings     Positives No Problem Findings           OBJECTIVE    INR Protime   Date Value Ref Range Status   01/25/2018 2.1 (A) 0.86 - 1.14 Final       ASSESSMENT / PLAN  INR assessment THER    Recheck INR In: 4 WEEKS    INR Location Clinic      Anticoagulation Summary as of 1/25/2018     INR goal 2.0-3.0   Today's INR 2.1   Maintenance plan 10 mg (10 mg x 1) every day   Full instructions 10 mg every day   Weekly total 70 mg   No change documented Emilia Hernandez, RN   Plan last modified Emilia Hernandez RN (1/12/2018)   Next INR check 2/22/2018   Target end date Indefinite    Indications   Long term current use of anticoagulant therapy [Z79.01]  Deep vein thrombosis (DVT) (H) [I82.409] [I82.409]         Anticoagulation Episode Summary     INR check location     Preferred lab     Send INR reminders to [facility]    Comments       Anticoagulation Care Providers     Provider Role Specialty Phone number    Parth Cloud MD [facility] [phone]            See the Encounter Report to view Anticoagulation Flowsheet and Dosing Calendar (Go to Encounters tab in chart review, and find the Anticoagulation Therapy Visit)    Dosage adjustment made based on physician directed care plan.    Emilia Hernandez RN

## 2018-01-25 NOTE — MR AVS SNAPSHOT
Naman DMITRY Alvarado   1/25/2018 4:00 PM   Anticoagulation Therapy Visit    Description:  37 year old male   Provider:  CHERELLE ANTI COAG   Department:  [facility]           INR as of 1/25/2018     Today's INR 2.1      Anticoagulation Summary as of 1/25/2018     INR goal 2.0-3.0   Today's INR 2.1   Full instructions 10 mg every day   Next INR check 2/22/2018    Indications   Long term current use of anticoagulant therapy [Z79.01]  Deep vein thrombosis (DVT) (H) [I82.409] [I82.409]         Your next Anticoagulation Clinic appointment(s)     Jan 25, 2018  4:00 PM CST   Anticoagulation Visit with CHERELLE ANTI COAG   [facility]    [address] [place] [address] [phone]            Feb 22, 2018  4:00 PM CST   Anticoagulation Visit with CHERELLE ANTI COAG   [facility] ([place] [id number] [facility] [address] [phone]              Contact Numbers     [facility]  Please call [phone] to cancel and/or reschedule your appointment   Please call [phone] with any problems or questions regarding your therapy.        January 2018 Details    Sun Mon Tue Wed Thu Fri Sat      1               2               3               4               5               6                 7               8               9               10               11               12               13                 14               15               16               17               18               19               20                 21               22               23               24               25      10 mg   See details      26      10 mg         27      10 mg           28      10 mg         29      10 mg         30      10 mg         31      10 mg             Date Details   01/25 This INR check               How to take your warfarin dose     To take:  10 mg Take 1 of the 10 mg tablets.           February 2018 Details    Sun Mon Tue Wed Thu Fri  Sat         1      10 mg         2      10 mg         3      10 mg           4      10 mg         5      10 mg         6      10 mg         7      10 mg         8      10 mg         9      10 mg         10      10 mg           11      10 mg         12      10 mg         13      10 mg         14      10 mg         15      10 mg         16      10 mg         17      10 mg           18      10 mg         19      10 mg         20      10 mg         21      10 mg         22            23               24                 25               26               27               28                   Date Details   No additional details    Date of next INR:  2/22/2018         How to take your warfarin dose     To take:  10 mg Take 1 of the 10 mg tablets.

## 2018-02-28 ENCOUNTER — ANTICOAGULATION THERAPY VISIT (OUTPATIENT)
Dept: NURSING | Facility: CLINIC | Age: 38
End: 2018-02-28
Payer: COMMERCIAL

## 2018-02-28 DIAGNOSIS — I82.409 DEEP VEIN THROMBOSIS (DVT) (H): ICD-10-CM

## 2018-02-28 DIAGNOSIS — Z79.01 LONG TERM CURRENT USE OF ANTICOAGULANT THERAPY: ICD-10-CM

## 2018-02-28 LAB — INR POINT OF CARE: 3.7 (ref 0.86–1.14)

## 2018-02-28 PROCEDURE — 85610 PROTHROMBIN TIME: CPT | Mod: QW

## 2018-02-28 PROCEDURE — 36416 COLLJ CAPILLARY BLOOD SPEC: CPT

## 2018-02-28 PROCEDURE — 99207 ZZC NO CHARGE NURSE ONLY: CPT

## 2018-02-28 NOTE — MR AVS SNAPSHOT
Naman ANDRES Jenny   2/28/2018 3:30 PM   Anticoagulation Therapy Visit    Description:  37 year old male   Provider:  CHERELLE ANTI COAG   Department:  [facility]           INR as of 2/28/2018     Today's INR 3.7!      Anticoagulation Summary as of 2/28/2018     INR goal 2.0-3.0   Today's INR 3.7!   Full instructions 2/28: Hold; Otherwise 10 mg every day   Next INR check 3/21/2018    Indications   Long term current use of anticoagulant therapy [Z79.01]  Deep vein thrombosis (DVT) (H) [I82.409] [I82.409]         Your next Anticoagulation Clinic appointment(s)     Mar 21, 2018  4:00 PM CDT   Anticoagulation Visit with CHERELLE ANTI JOSE ANTONIO   [facility] ([facility]    [address] [place] [address] [phone]              Contact Numbers     [facility]  Please call [phone] to cancel and/or reschedule your appointment   Please call [phone] with any problems or questions regarding your therapy.        February 2018 Details    Sun Mon Tue Wed Thu Fri Sat         1               2               3                 4               5               6               7               8               9               10                 11               12               13               14               15               16               17                 18               19               20               21               22               23               24                 25               26               27               28      Hold   See details          Date Details   02/28 This INR check               How to take your warfarin dose     Hold Do not take your warfarin dose. See the Details table to the right for additional instructions.                March 2018 Details    Sun Mon Tue Wed Thu Fri Sat         1      10 mg         2      10 mg         3      10 mg           4      10 mg         5      10 mg         6      10 mg         7      10 mg         8      10 mg         9      10 mg          10      10 mg           11      10 mg         12      10 mg         13      10 mg         14      10 mg         15      10 mg         16      10 mg         17      10 mg           18      10 mg         19      10 mg         20      10 mg         21            22               23               24                 25               26               27               28               29               30               31                Date Details   No additional details    Date of next INR:  3/21/2018         How to take your warfarin dose     To take:  10 mg Take 1 of the 10 mg tablets.

## 2018-02-28 NOTE — PROGRESS NOTES
ANTICOAGULATION FOLLOW-UP CLINIC VISIT    Patient Name:  Naman Alvarado  Date:  2/28/2018  Contact Type:  Face to Face    SUBJECTIVE:     Patient Findings     Positives No Problem Findings, Unexplained INR or factor level change    Comments Patient unable to come back in 2 weeks due to being in [place]             OBJECTIVE    INR Protime   Date Value Ref Range Status   02/28/2018 3.7 (A) 0.86 - 1.14 Final       ASSESSMENT / PLAN  INR assessment SUPRA    Recheck INR In: 3 WEEKS    INR Location Clinic      Anticoagulation Summary as of 2/28/2018     INR goal 2.0-3.0   Today's INR 3.7!   Maintenance plan 10 mg (10 mg x 1) every day   Full instructions 2/28: Hold; Otherwise 10 mg every day   Weekly total 70 mg   Plan last modified Emilia Hernandez, RN (1/12/2018)   Next INR check 3/21/2018   Target end date Indefinite    Indications   Long term current use of anticoagulant therapy [Z79.01]  Deep vein thrombosis (DVT) (H) [I82.409] [I82.409]         Anticoagulation Episode Summary     INR check location     Preferred lab     Send INR reminders to [facility] CLINIC    Comments       Anticoagulation Care Providers     Provider Role Specialty Phone number    Parth Cloud MD [facility] Internal Medicine [phone]            See the Encounter Report to view Anticoagulation Flowsheet and Dosing Calendar (Go to Encounters tab in chart review, and find the Anticoagulation Therapy Visit)    Dosage adjustment made based on physician directed care plan.    Emilia Hernandez, NIDA

## 2018-03-22 ENCOUNTER — TELEPHONE (OUTPATIENT)
Dept: NURSING | Facility: CLINIC | Age: 38
End: 2018-03-22

## 2018-03-22 NOTE — TELEPHONE ENCOUNTER
Message left for patient to call the INR Clinic # at [phone] to schedule an INR follow up appointment. Patient no showed for appointment on 3/21/18    Emilia Hernandez RN - BC

## 2018-04-10 NOTE — PROGRESS NOTES
ANTICOAGULATION FOLLOW-UP CLINIC VISIT    Patient Name:  Naman Alvarado  Date:  3/31/2017  Contact Type:  Face to Face    SUBJECTIVE:     Patient Findings     Positives No Problem Findings           OBJECTIVE    INR Protime   Date Value Ref Range Status   03/31/2017 3.0 (A) 0.86 - 1.14 Final       ASSESSMENT / PLAN  INR assessment THER    Recheck INR In: 2 WEEKS    INR Location Clinic      Anticoagulation Summary as of 3/31/2017     INR goal 2.0-3.0    Today's INR 3.0    Maintenance plan 10 mg (10 mg x 1) every day    Full instructions 10 mg every day    Weekly total 70 mg    Weekly max dose 70 mg    No change documented Natasha Marcus RN    Plan last modified Natasha Marcus RN (3/24/2017)    Next INR check 4/14/2017    Target end date       Anticoagulation Episode Summary     INR check location [facility]    Preferred lab     Send INR reminders to  [facility] CL[facility]IC    Comments       Anticoagulation Care Providers     Provider Role Specialty Phone number    Parth Cloud MD Responsible Internal Medicine [phone]            See the Encounter Report to view Anticoagulation Flowsheet and Dosing Calendar (Go to Encounters tab in chart review, and find the Anticoagulation Therapy Visit)    Dosage adjustment made based on physician directed care plan. Reviewed both bleeding and clotting signs and symptoms with patient this visit. Pt. has no further questions or concerns.  Will call with any changes to diet, medications, or missed doses at INR line [phone].      Natasha Marcus RN               
N/A

## 2018-04-11 ENCOUNTER — ANTICOAGULATION THERAPY VISIT (OUTPATIENT)
Dept: NURSING | Facility: CLINIC | Age: 38
End: 2018-04-11
Payer: COMMERCIAL

## 2018-04-11 DIAGNOSIS — I82.409 DEEP VEIN THROMBOSIS (DVT) (H): ICD-10-CM

## 2018-04-11 DIAGNOSIS — Z79.01 LONG TERM CURRENT USE OF ANTICOAGULANT THERAPY: ICD-10-CM

## 2018-04-11 LAB — INR POINT OF CARE: 1.6 (ref 0.86–1.14)

## 2018-04-11 PROCEDURE — 36416 COLLJ CAPILLARY BLOOD SPEC: CPT

## 2018-04-11 PROCEDURE — 99207 ZZC NO CHARGE NURSE ONLY: CPT

## 2018-04-11 PROCEDURE — 85610 PROTHROMBIN TIME: CPT | Mod: QW

## 2018-04-11 NOTE — MR AVS SNAPSHOT
Naman DMITRY Alvarado   4/11/2018 4:00 PM   Anticoagulation Therapy Visit    Description:  37 year old male   Provider:  CHERELLE ANTI COAG   Department:  [facility]           INR as of 4/11/2018     Today's INR 1.6!      Anticoagulation Summary as of 4/11/2018     INR goal 2.0-3.0   Today's INR 1.6!   Full instructions 4/11: 15 mg; Otherwise 10 mg every day   Next INR check 4/25/2018    Indications   Long term current use of anticoagulant therapy [Z79.01]  Deep vein thrombosis (DVT) (H) [I82.409] [I82.409]         Your next Anticoagulation Clinic appointment(s)     Apr 25, 2018  4:00 PM CDT   Anticoagulation Visit with CHERELLE ANTI JOSE ANTONIO   [facility] ([address] [address] [phone]              Contact Numbers     [facility]  Please call [phone] to cancel and/or reschedule your appointment   Please call [phone] with any problems or questions regarding your therapy.        April 2018 Details    Sun Mon Tue Wed Thu Fri Sat     1               2               3               4               5               6               7                 8               9               10               11      15 mg   See details      12      10 mg         13      10 mg         14      10 mg           15      10 mg         16      10 mg         17      10 mg         18      10 mg         19      10 mg         20      10 mg         21      10 mg           22      10 mg         23      10 mg         24      10 mg         25            26               27               28                 29               30                     Date Details   04/11 This INR check       Date of next INR:  4/25/2018         How to take your warfarin dose     To take:  10 mg Take 1 of the 10 mg tablets.    To take:  15 mg Take 1.5 of the 10 mg tablets.

## 2018-04-11 NOTE — PROGRESS NOTES
ANTICOAGULATION FOLLOW-UP CLINIC VISIT    Patient Name:  Naman Alvarado  Date:  4/11/2018  Contact Type:  Face to Face    SUBJECTIVE:     Patient Findings     Positives Missed doses, No Problem Findings           OBJECTIVE    INR Protime   Date Value Ref Range Status   04/11/2018 1.6 (A) 0.86 - 1.14 Final       ASSESSMENT / PLAN  INR assessment SUB    Recheck INR In: 2 WEEKS    INR Location Clinic      Anticoagulation Summary as of 4/11/2018     INR goal 2.0-3.0   Today's INR 1.6!   Maintenance plan 10 mg (10 mg x 1) every day   Full instructions 4/11: 15 mg; Otherwise 10 mg every day   Weekly total 70 mg   Plan last modified Emilia Hernandez, RN (1/12/2018)   Next INR check 4/25/2018   Target end date Indefinite    Indications   Long term current use of anticoagulant therapy [Z79.01]  Deep vein thrombosis (DVT) (H) [I82.409] [I82.409]         Anticoagulation Episode Summary     INR check location     Preferred lab     Send INR reminders to [facility] CLINIC    Comments       Anticoagulation Care Providers     Provider Role Specialty Phone number    Parth Cloud MD [facility] Internal Medicine [phone]            See the Encounter Report to view Anticoagulation Flowsheet and Dosing Calendar (Go to Encounters tab in chart review, and find the Anticoagulation Therapy Visit)    Dosage adjustment made based on physician directed care plan.    Emilia Hernandez, NIDA

## 2018-04-25 ENCOUNTER — ANTICOAGULATION THERAPY VISIT (OUTPATIENT)
Dept: NURSING | Facility: CLINIC | Age: 38
End: 2018-04-25
Payer: COMMERCIAL

## 2018-04-25 DIAGNOSIS — Z79.01 LONG TERM CURRENT USE OF ANTICOAGULANT THERAPY: ICD-10-CM

## 2018-04-25 DIAGNOSIS — I82.409 DEEP VEIN THROMBOSIS (DVT) (H): ICD-10-CM

## 2018-04-25 LAB — INR POINT OF CARE: 2.2 (ref 0.86–1.14)

## 2018-04-25 PROCEDURE — 85610 PROTHROMBIN TIME: CPT | Mod: QW

## 2018-04-25 PROCEDURE — 36416 COLLJ CAPILLARY BLOOD SPEC: CPT

## 2018-04-25 PROCEDURE — 99207 ZZC NO CHARGE NURSE ONLY: CPT

## 2018-04-25 NOTE — MR AVS SNAPSHOT
Naman DMITRY Alvarado   4/25/2018 4:00 PM   Anticoagulation Therapy Visit    Description:  37 year old male   Provider:  CHERELLE ANTI COAG   Department:  [facility]           INR as of 4/25/2018     Today's INR 2.2      Anticoagulation Summary as of 4/25/2018     INR goal 2.0-3.0   Today's INR 2.2   Full instructions 10 mg every day   Next INR check 5/23/2018    Indications   Long term current use of anticoagulant therapy [Z79.01]  Deep vein thrombosis (DVT) (H) [I82.409] [I82.409]         Your next Anticoagulation Clinic appointment(s)     May 23, 2018  4:00 PM CDT   Anticoagulation Visit with CHERELLE ANTI COACLINTON   [facility] ([facility]    [address] [facility] [address] [phone]              Contact Numbers     [facility]  Please call [phone] to cancel and/or reschedule your appointment   Please call [phone] with any problems or questions regarding your therapy.        April 2018 Details    Sun Mon Tue Wed Thu Fri Sat     1               2               3               4               5               6               7                 8               9               10               11               12               13               14                 15               16               17               18               19               20               21                 22               23               24               25      10 mg   See details      26      10 mg         27      10 mg         28      10 mg           29      10 mg         30      10 mg               Date Details   04/25 This INR check               How to take your warfarin dose     To take:  10 mg Take 1 of the 10 mg tablets.           May 2018 Details    Sun Mon Tue Wed Thu Fri Sat       1      10 mg         2      10 mg         3      10 mg         4      10 mg         5      10 mg           6      10 mg         7      10 mg         8      10 mg         9      10 mg         10      10 mg         11       10 mg         12      10 mg           13      10 mg         14      10 mg         15      10 mg         16      10 mg         17      10 mg         18      10 mg         19      10 mg           20      10 mg         21      10 mg         22      10 mg         23            24               25               26                 27               28               29               30               31                  Date Details   No additional details    Date of next INR:  5/23/2018         How to take your warfarin dose     To take:  10 mg Take 1 of the 10 mg tablets.

## 2018-04-25 NOTE — PROGRESS NOTES
ANTICOAGULATION FOLLOW-UP CLINIC VISIT    Patient Name:  Naman Alvarado  Date:  4/25/2018  Contact Type:  Face to Face    SUBJECTIVE:     Patient Findings     Positives No Problem Findings           OBJECTIVE    INR Protime   Date Value Ref Range Status   04/25/2018 2.2 (A) 0.86 - 1.14 Final       ASSESSMENT / PLAN  INR assessment THER    Recheck INR In: 4 WEEKS    INR Location Clinic      Anticoagulation Summary as of 4/25/2018     INR goal 2.0-3.0   Today's INR 2.2   Maintenance plan 10 mg (10 mg x 1) every day   Full instructions 10 mg every day   Weekly total 70 mg   No change documented Emilia Hernandez, RN   Plan last modified Emilia Hernandez RN (1/12/2018)   Next INR check 5/23/2018   Target end date Indefinite    Indications   Long term current use of anticoagulant therapy [Z79.01]  Deep vein thrombosis (DVT) (H) [I82.409] [I82.409]         Anticoagulation Episode Summary     INR check location     Preferred lab     Send INR reminders to [facility] CL[facility]    Comments       Anticoagulation Care Providers     Provider Role Specialty Phone number    Parth Cloud MD [facility] Internal Medicine [phone]            See the Encounter Report to view Anticoagulation Flowsheet and Dosing Calendar (Go to Encounters tab in chart review, and find the Anticoagulation Therapy Visit)    Dosage adjustment made based on physician directed care plan.    Emilia Hernandez RN

## 2018-05-23 ENCOUNTER — ANTICOAGULATION THERAPY VISIT (OUTPATIENT)
Dept: NURSING | Facility: CLINIC | Age: 38
End: 2018-05-23
Payer: COMMERCIAL

## 2018-05-23 DIAGNOSIS — Z79.01 LONG TERM CURRENT USE OF ANTICOAGULANT THERAPY: ICD-10-CM

## 2018-05-23 DIAGNOSIS — I82.409 DEEP VEIN THROMBOSIS (DVT) (H): ICD-10-CM

## 2018-05-23 LAB — INR POINT OF CARE: 5.4 (ref 0.86–1.14)

## 2018-05-23 PROCEDURE — 36416 COLLJ CAPILLARY BLOOD SPEC: CPT

## 2018-05-23 PROCEDURE — 99207 ZZC NO CHARGE NURSE ONLY: CPT

## 2018-05-23 PROCEDURE — 85610 PROTHROMBIN TIME: CPT | Mod: QW

## 2018-05-23 NOTE — PROGRESS NOTES
ANTICOAGULATION FOLLOW-UP CLINIC VISIT    Patient Name:  Naman Alvarado  Date:  5/23/2018  Contact Type:  Face to Face    SUBJECTIVE:     Patient Findings     Positives No Problem Findings, Unexplained INR or factor level change           OBJECTIVE    INR Protime   Date Value Ref Range Status   05/23/2018 5.4 (A) 0.86 - 1.14 Final       ASSESSMENT / PLAN  INR assessment SUPRA    Recheck INR In: 2 WEEKS    INR Location Clinic      Anticoagulation Summary as of 5/23/2018     INR goal 2.0-3.0   Today's INR 5.4!   Warfarin maintenance plan 10 mg (10 mg x 1) every day   Full warfarin instructions 5/23: Hold; 5/24: 5 mg; Otherwise 10 mg every day   Weekly warfarin total 70 mg   Plan last modified Emilia Hernandez, RN (1/12/2018)   Next INR check 6/6/2018   Target end date Indefinite    Indications   Long term current use of anticoagulant therapy [Z79.01]  Deep vein thrombosis (DVT) (H) [I82.409] [I82.409]         Anticoagulation Episode Summary     INR check location     Preferred lab     Send INR reminders to [facility] CL[facility]    Comments       Anticoagulation Care Providers     Provider Role Specialty Phone number    Parth Cloud MD Responsible Internal Medicine [phone]            See the Encounter Report to view Anticoagulation Flowsheet and Dosing Calendar (Go to Encounters tab in chart review, and find the Anticoagulation Therapy Visit)    Dosage adjustment made based on physician directed care plan.    Emilia Hernandez, RN

## 2018-05-23 NOTE — MR AVS SNAPSHOT
Naman ANDRES Jenny   5/23/2018 4:00 PM   Anticoagulation Therapy Visit    Description:  37 year old male   Provider:  CHERELLE ANTI COAG   Department:  [facility]           INR as of 5/23/2018     Today's INR 5.4!      Anticoagulation Summary as of 5/23/2018     INR goal 2.0-3.0   Today's INR 5.4!   Full warfarin instructions 5/23: Hold; 5/24: 5 mg; Otherwise 10 mg every day   Next INR check 6/6/2018    Indications   Long term current use of anticoagulant therapy [Z79.01]  Deep vein thrombosis (DVT) (H) [I82.409] [I82.409]         Your next Anticoagulation Clinic appointment(s)     Jun 06, 2018  4:00 PM CDT   Anticoagulation Visit with CHERELLE ANTI JOSE ANTONIO   [facility] ([address] [id number] [phone]              Contact Numbers     [facility]  Please call [phone] to cancel and/or reschedule your appointment   Please call [phone] with any problems or questions regarding your therapy.        May 2018 Details    Sun Mon Tue Wed Thu Fri Sat       1               2               3               4               5                 6               7               8               9               10               11               12                 13               14               15               16               17               18               19                 20               21               22               23      Hold   See details      24      5 mg         25      10 mg         26      10 mg           27      10 mg         28      10 mg         29      10 mg         30      10 mg         31      10 mg            Date Details   05/23 This INR check               How to take your warfarin dose     To take:  5 mg Take 0.5 of a 10 mg tablet.    To take:  10 mg Take 1 of the 10 mg tablets.    Hold Do not take your warfarin dose. See the Details table to the right for additional instructions.                June 2018 Details    Sun Mon Tue Wed Thu Fri  Sat          1      10 mg         2      10 mg           3      10 mg         4      10 mg         5      10 mg         6            7               8               9                 10               11               12               13               14               15               16                 17               18               19               20               21               22               23                 24               25               26               27               28               29               30                Date Details   No additional details    Date of next INR:  6/6/2018         How to take your warfarin dose     To take:  10 mg Take 1 of the 10 mg tablets.

## 2018-06-06 ENCOUNTER — ANTICOAGULATION THERAPY VISIT (OUTPATIENT)
Dept: NURSING | Facility: CLINIC | Age: 38
End: 2018-06-06
Payer: COMMERCIAL

## 2018-06-06 DIAGNOSIS — I82.409 DEEP VEIN THROMBOSIS (DVT) (H): ICD-10-CM

## 2018-06-06 DIAGNOSIS — Z79.01 LONG TERM CURRENT USE OF ANTICOAGULANT THERAPY: ICD-10-CM

## 2018-06-06 LAB — INR POINT OF CARE: 2 (ref 0.86–1.14)

## 2018-06-06 PROCEDURE — 85610 PROTHROMBIN TIME: CPT | Mod: QW

## 2018-06-06 PROCEDURE — 36416 COLLJ CAPILLARY BLOOD SPEC: CPT

## 2018-06-06 PROCEDURE — 99207 ZZC NO CHARGE NURSE ONLY: CPT

## 2018-06-06 NOTE — MR AVS SNAPSHOT
Naman DMITRY Alvarado   6/6/2018 4:00 PM   Anticoagulation Therapy Visit    Description:  37 year old male   Provider:  CHERELLE ANTI COAG   Department:  [facility]           INR as of 6/6/2018     Today's INR 2.0      Anticoagulation Summary as of 6/6/2018     INR goal 2.0-3.0   Today's INR 2.0   Full warfarin instructions 10 mg every day   Next INR check 7/11/2018    Indications   Long term current use of anticoagulant therapy [Z79.01]  Deep vein thrombosis (DVT) (H) [I82.409] [I82.409]         Your next Anticoagulation Clinic appointment(s)     Jun 06, 2018  4:00 PM CDT   Anticoagulation Visit with CHERELLE ANTI COAG   [facility] ([address] [address] [phone]            Jul 11, 2018  4:00 PM CDT   Anticoagulation Visit with CHERELLE ANTI COAG   [facility] ([place] [id number] [facility] [address] [phone]              Contact Numbers     [facility]  Please call [phone] to cancel and/or reschedule your appointment   Please call [phone] with any problems or questions regarding your therapy.        June 2018 Details    Sun Mon Tue Wed Thu Fri Sat          1               2                 3               4               5               6      10 mg   See details      7      10 mg         8      10 mg         9      10 mg           10      10 mg         11      10 mg         12      10 mg         13      10 mg         14      10 mg         15      10 mg         16      10 mg           17      10 mg         18      10 mg         19      10 mg         20      10 mg         21      10 mg         22      10 mg         23      10 mg           24      10 mg         25      10 mg         26      10 mg         27      10 mg         28      10 mg         29      10 mg         30      10 mg          Date Details   06/06 This INR check               How to take your warfarin dose     To take:  10 mg Take 1 of the  10 mg tablets.           July 2018 Details    Sun Mon Tue Wed Thu Fri Sat     1      10 mg         2      10 mg         3      10 mg         4      10 mg         5      10 mg         6      10 mg         7      10 mg           8      10 mg         9      10 mg         10      10 mg         11            12               13               14                 15               16               17               18               19               20               21                 22               23               24               25               26               27               28                 29               30               31                    Date Details   No additional details    Date of next INR:  7/11/2018         How to take your warfarin dose     To take:  10 mg Take 1 of the 10 mg tablets.

## 2018-06-06 NOTE — PROGRESS NOTES
ANTICOAGULATION FOLLOW-UP CLINIC VISIT    Patient Name:  Naman Alvarado  Date:  6/6/2018  Contact Type:  Face to Face    SUBJECTIVE:     Patient Findings     Positives No Problem Findings           OBJECTIVE    INR Protime   Date Value Ref Range Status   06/06/2018 2.0 (A) 0.86 - 1.14 Final       ASSESSMENT / PLAN  INR assessment THER    Recheck INR In: 5 WEEKS    INR Location Clinic      Anticoagulation Summary as of 6/6/2018     INR goal 2.0-3.0   Today's INR 2.0   Warfarin maintenance plan 10 mg (10 mg x 1) every day   Full warfarin instructions 10 mg every day   Weekly warfarin total 70 mg   No change documented Emilia Hernandez RN   Plan last modified Emilia Hernandez RN (1/12/2018)   Next INR check 7/11/2018   Target end date Indefinite    Indications   Long term current use of anticoagulant therapy [Z79.01]  Deep vein thrombosis (DVT) (H) [I82.409] [I82.409]         Anticoagulation Episode Summary     INR check location     Preferred lab     Send INR reminders to [facility] CLINIC    Comments       Anticoagulation Care Providers     Provider Role Specialty Phone number    Parth Cloud MD [facility] Internal Medicine [phone]            See the Encounter Report to view Anticoagulation Flowsheet and Dosing Calendar (Go to Encounters tab in chart review, and find the Anticoagulation Therapy Visit)    Dosage adjustment made based on physician directed care plan.    Emilia Hernandez RN

## 2018-07-11 ENCOUNTER — ANTICOAGULATION THERAPY VISIT (OUTPATIENT)
Dept: NURSING | Facility: CLINIC | Age: 38
End: 2018-07-11
Payer: COMMERCIAL

## 2018-07-11 DIAGNOSIS — I82.409 DEEP VEIN THROMBOSIS (DVT) (H): ICD-10-CM

## 2018-07-11 DIAGNOSIS — Z79.01 LONG TERM CURRENT USE OF ANTICOAGULANT THERAPY: ICD-10-CM

## 2018-07-11 LAB — INR POINT OF CARE: 3 (ref 0.86–1.14)

## 2018-07-11 PROCEDURE — 99207 ZZC NO CHARGE NURSE ONLY: CPT

## 2018-07-11 PROCEDURE — 85610 PROTHROMBIN TIME: CPT | Mod: QW

## 2018-07-11 PROCEDURE — 36416 COLLJ CAPILLARY BLOOD SPEC: CPT

## 2018-07-11 NOTE — PROGRESS NOTES
ANTICOAGULATION FOLLOW-UP CLINIC VISIT    Patient Name:  Naman Alvarado  Date:  7/11/2018  Contact Type:  Face to Face    SUBJECTIVE:     Patient Findings     Positives No Problem Findings           OBJECTIVE    INR Protime   Date Value Ref Range Status   07/11/2018 3.0 (A) 0.86 - 1.14 Final       ASSESSMENT / PLAN  INR assessment THER    Recheck INR In: 6 WEEKS    INR Location Clinic      Anticoagulation Summary as of 7/11/2018     INR goal 2.0-3.0   Today's INR 3.0   Warfarin maintenance plan 10 mg (10 mg x 1) every day   Full warfarin instructions 10 mg every day   Weekly warfarin total 70 mg   No change documented Emilia Hernandez, RN   Plan last modified Emilia Hernandez RN (1/12/2018)   Next INR check 8/22/2018   Target end date Indefinite    Indications   Long term current use of anticoagulant therapy [Z79.01]  Deep vein thrombosis (DVT) (H) [I82.409] [I82.409]         Anticoagulation Episode Summary     INR check location     Preferred lab     Send INR reminders to [facility] CLINIC    Comments       Anticoagulation Care Providers     Provider Role Specialty Phone number    Parth Cloud MD [facility] Internal Medicine [phone]            See the Encounter Report to view Anticoagulation Flowsheet and Dosing Calendar (Go to Encounters tab in chart review, and find the Anticoagulation Therapy Visit)    Dosage adjustment made based on physician directed care plan.    Emilia Hernandez RN

## 2018-07-11 NOTE — MR AVS SNAPSHOT
Naman Alvarado   7/11/2018 4:00 PM   Anticoagulation Therapy Visit    Description:  37 year old male   Provider:  CHERELLE ANTI COAG   Department:  [facility]           INR as of 7/11/2018     Today's INR 3.0      Anticoagulation Summary as of 7/11/2018     INR goal 2.0-3.0   Today's INR 3.0   Full warfarin instructions 10 mg every day   Next INR check 8/22/2018    Indications   Long term current use of anticoagulant therapy [Z79.01]  Deep vein thrombosis (DVT) (H) [I82.409] [I82.409]         Your next Anticoagulation Clinic appointment(s)     Jul 11, 2018  4:00 PM CDT   Anticoagulation Visit with CHERELLE ANTI COAG   [facility] ([place] [id number] [facility] [address] [phone]            Aug 22, 2018  4:00 PM CDT   Anticoagulation Visit with CHERELLE ANTI COAG   [facility] ([place] [id number] [facility] [address] [phone]              Contact Numbers     [facility]  Please call [phone] to cancel and/or reschedule your appointment   Please call [phone] with any problems or questions regarding your therapy.        July 2018 Details    Sun Mon Tue Wed Thu Fri Sat     1               2               3               4               5               6               7                 8               9               10               11      10 mg   See details      12      10 mg         13      10 mg         14      10 mg           15      10 mg         16      10 mg         17      10 mg         18      10 mg         19      10 mg         20      10 mg         21      10 mg           22      10 mg         23      10 mg         24      10 mg         25      10 mg         26      10 mg         27      10 mg         28      10 mg           29      10 mg         30      10 mg         31      10 mg              Date Details   07/11 This INR check               How to take your warfarin dose     To take:  10 mg Take 1 of the  10 mg tablets.           August 2018 Details    Sun Mon Tue Wed Thu Fri Sat        1      10 mg         2      10 mg         3      10 mg         4      10 mg           5      10 mg         6      10 mg         7      10 mg         8      10 mg         9      10 mg         10      10 mg         11      10 mg           12      10 mg         13      10 mg         14      10 mg         15      10 mg         16      10 mg         17      10 mg         18      10 mg           19      10 mg         20      10 mg         21      10 mg         22            23               24               25                 26               27               28               29               30               31                 Date Details   No additional details    Date of next INR:  8/22/2018         How to take your warfarin dose     To take:  10 mg Take 1 of the 10 mg tablets.

## 2018-08-23 ENCOUNTER — ANTICOAGULATION THERAPY VISIT (OUTPATIENT)
Dept: NURSING | Facility: CLINIC | Age: 38
End: 2018-08-23
Payer: COMMERCIAL

## 2018-08-23 DIAGNOSIS — I82.409 DEEP VEIN THROMBOSIS (DVT) (H): ICD-10-CM

## 2018-08-23 DIAGNOSIS — Z79.01 LONG TERM CURRENT USE OF ANTICOAGULANT THERAPY: ICD-10-CM

## 2018-08-23 LAB — INR POINT OF CARE: 2.4 (ref 0.86–1.14)

## 2018-08-23 PROCEDURE — 36416 COLLJ CAPILLARY BLOOD SPEC: CPT

## 2018-08-23 PROCEDURE — 99207 ZZC NO CHARGE NURSE ONLY: CPT

## 2018-08-23 PROCEDURE — 85610 PROTHROMBIN TIME: CPT | Mod: QW

## 2018-08-23 NOTE — MR AVS SNAPSHOT
Naman DMITRY Alvarado   8/23/2018 3:45 PM   Anticoagulation Therapy Visit    Description:  38 year old male   Provider:  CHERELLE ANTI COAG   Department:  [facility]           INR as of 8/23/2018     Today's INR 2.4      Anticoagulation Summary as of 8/23/2018     INR goal 2.0-3.0   Today's INR 2.4   Full warfarin instructions 10 mg every day   Next INR check 10/4/2018    Indications   Long term current use of anticoagulant therapy [Z79.01]  Deep vein thrombosis (DVT) (H) [I82.409] [I82.409]         Your next Anticoagulation Clinic appointment(s)     Aug 23, 2018  3:45 PM CDT   Anticoagulation Visit with CHERELLE ANTI COAG   [facility] ([place] [id number] [facility] [address] [phone]            Oct 04, 2018  4:00 PM CDT   Anticoagulation Visit with CHERELLE ANTI COAG   [facility] ([place] [id number] [facility] [address] [phone]              Contact Numbers     [facility]  Please call [phone] to cancel and/or reschedule your appointment   Please call [phone] with any problems or questions regarding your therapy.        August 2018 Details    Sun Mon Tue Wed Thu Fri Sat        1               2               3               4                 5               6               7               8               9               10               11                 12               13               14               15               16               17               18                 19               20               21               22               23      10 mg   See details      24      10 mg         25      10 mg           26      10 mg         27      10 mg         28      10 mg         29      10 mg         30      10 mg         31      10 mg           Date Details   08/23 This INR check               How to take your warfarin dose     To take:  10 mg Take 1 of the 10 mg tablets.           September 2018 Details    [facility]  Tue Wed Thu Fri Sat           1      10 mg           2      10 mg         3      10 mg         4      10 mg         5      10 mg         6      10 mg         7      10 mg         8      10 mg           9      10 mg         10      10 mg         11      10 mg         12      10 mg         13      10 mg         14      10 mg         15      10 mg           16      10 mg         17      10 mg         18      10 mg         19      10 mg         20      10 mg         21      10 mg         22      10 mg           23      10 mg         24      10 mg         25      10 mg         26      10 mg         27      10 mg         28      10 mg         29      10 mg           30      10 mg                Date Details   No additional details            How to take your warfarin dose     To take:  10 mg Take 1 of the 10 mg tablets.           October 2018 Details    Sun Mon Tue Wed Thu Fri Sat      1      10 mg         2      10 mg         3      10 mg         4            5               6                 7               8               9               10               11               12               13                 14               15               16               17               18               19               20                 21               22               23               24               25               26               27                 28               29               30               31                   Date Details   No additional details    Date of next INR:  10/4/2018         How to take your warfarin dose     To take:  10 mg Take 1 of the 10 mg tablets.

## 2018-08-23 NOTE — PROGRESS NOTES
ANTICOAGULATION FOLLOW-UP CLINIC VISIT    Patient Name:  Naman Alvarado  Date:  8/23/2018  Contact Type:  Face to Face    SUBJECTIVE:     Patient Findings     Positives No Problem Findings    Comments Bleeding Signs/Symptoms: NO  Thromboembolic Signs/Symptoms: NO     Medication Changes:  NO  Dietary Changes:  NO  Bacterial/Viral Infection: NO     Missed Coumadin Doses: NO  Other Concerns:  NO             OBJECTIVE    INR Protime   Date Value Ref Range Status   08/23/2018 2.4 (A) 0.86 - 1.14 Final       ASSESSMENT / PLAN  INR assessment THER    Recheck INR In: 6 WEEKS    INR Location Clinic      Anticoagulation Summary as of 8/23/2018     INR goal 2.0-3.0   Today's INR 2.4   Warfarin maintenance plan 10 mg (10 mg x 1) every day   Full warfarin instructions 10 mg every day   Weekly warfarin total 70 mg   No change documented Emilia Hernandez RN   Plan last modified Emilia Hernandez RN (1/12/2018)   Next INR check 10/4/2018   Target end date Indefinite    Indications   Long term current use of anticoagulant therapy [Z79.01]  Deep vein thrombosis (DVT) (H) [I82.409] [I82.409]         Anticoagulation Episode Summary     INR check location     Preferred lab     Send INR reminders to [facility]    Comments       Anticoagulation Care Providers     Provider Role Specialty Phone number    Parth Cloud MD [facility] Internal Medicine [phone]            See the Encounter Report to view Anticoagulation Flowsheet and Dosing Calendar (Go to Encounters tab in chart review, and find the Anticoagulation Therapy Visit)    Dosage adjustment made based on physician directed care plan.    Emilia Hernandez RN

## 2018-08-29 DIAGNOSIS — Z79.01 LONG TERM CURRENT USE OF ANTICOAGULANT THERAPY: ICD-10-CM

## 2018-08-29 DIAGNOSIS — I42.9 CARDIOMYOPATHY, UNSPECIFIED TYPE (H): ICD-10-CM

## 2018-08-30 RX ORDER — WARFARIN SODIUM 10 MG/1
TABLET ORAL
Qty: 90 TABLET | Refills: 0 | Status: SHIPPED | OUTPATIENT
Start: 2018-08-30 | End: 2019-01-14

## 2018-10-05 ENCOUNTER — TELEPHONE (OUTPATIENT)
Dept: NURSING | Facility: CLINIC | Age: 38
End: 2018-10-05

## 2018-10-05 NOTE — LETTER
October 17, 2018      Naman Alvarado  [address]  [place] [address]        Dear Naman,     Regular follow-up care while on anti-coagulation medication (Warfarin) is vital to your health because of your underlying health condition. Your medication can prevent strokes or clots from forming. It can also cause life-threatening bleeding complications IF it is not monitored on a regular basis. You are over due to have your INR checked.     Please call [phone] or [phone] to schedule an INR appointment at your earliest convenience.      Sincerely,    Emilia MONTE RN - [facility]

## 2018-10-05 NOTE — TELEPHONE ENCOUNTER
Message left for patient to call the INR Clinic # at [phone]. He no showed for appointment on 10/4/18    Emilia Hernandez RN - BC

## 2018-10-17 NOTE — TELEPHONE ENCOUNTER
Attempted to reach out to patient to schedule an INR follow up appointment. VM box was full, unable to leave a message. Letter mailed to patient for reminder to have INR checked.     Emilia Hernandez RN - BC

## 2018-11-05 NOTE — TELEPHONE ENCOUNTER
Message left for patient to call the INR Clinic # at [phone]. He no showed for appointment on 10/4/18     Emilia Hernandez RN - BC

## 2018-11-21 NOTE — TELEPHONE ENCOUNTER
Attempted to leave a message for patient to schedule a message. Mail box is full.    Emilia Hernandez RN - BC

## 2018-12-07 ENCOUNTER — ANTICOAGULATION THERAPY VISIT (OUTPATIENT)
Dept: NURSING | Facility: CLINIC | Age: 38
End: 2018-12-07
Payer: COMMERCIAL

## 2018-12-07 DIAGNOSIS — I82.409 DEEP VEIN THROMBOSIS (DVT) (H): ICD-10-CM

## 2018-12-07 DIAGNOSIS — Z79.01 LONG TERM CURRENT USE OF ANTICOAGULANT THERAPY: ICD-10-CM

## 2018-12-07 LAB — INR POINT OF CARE: 1.6 (ref 0.86–1.14)

## 2018-12-07 PROCEDURE — 85610 PROTHROMBIN TIME: CPT | Mod: QW

## 2018-12-07 PROCEDURE — 99207 ZZC NO CHARGE NURSE ONLY: CPT

## 2018-12-07 PROCEDURE — 36416 COLLJ CAPILLARY BLOOD SPEC: CPT

## 2018-12-07 NOTE — PROGRESS NOTES
ANTICOAGULATION FOLLOW-UP CLINIC VISIT    Patient Name:  Naman Alvarado  Date:  12/7/2018  Contact Type:  Face to Face    SUBJECTIVE:     Patient Findings     Positives Unexplained INR or factor level change    Comments Bleeding Signs/Symptoms: NO  Thromboembolic Signs/Symptoms: NO     Medication Changes:  NO  Dietary Changes:  NO  Bacterial/Viral Infection: NO     Missed Coumadin Doses: NO  Other Concerns:  NO             OBJECTIVE    INR Protime   Date Value Ref Range Status   12/07/2018 1.6 (A) 0.86 - 1.14 Final       ASSESSMENT / PLAN  INR assessment SUB    Recheck INR In: 10 DAYS    INR Location Clinic      Anticoagulation Summary as of 12/7/2018     INR goal 2.0-3.0   Today's INR 1.6!   Warfarin maintenance plan 10 mg (10 mg x 1) every day   Full warfarin instructions 12/7: 15 mg; Otherwise 10 mg every day   Weekly warfarin total 70 mg   Plan last modified Emilia Hernandez RN (1/12/2018)   Next INR check 12/19/2018   Target end date Indefinite    Indications   Long term current use of anticoagulant therapy [Z79.01]  Deep vein thrombosis (DVT) (H) [I82.409] [I82.409]         Anticoagulation Episode Summary     INR check location     Preferred lab     Send INR reminders to [facility]    Comments       Anticoagulation Care Providers     Provider Role Specialty Phone number    Parth Cloud MD [facility] Internal Medicine [phone]            See the Encounter Report to view Anticoagulation Flowsheet and Dosing Calendar (Go to Encounters tab in chart review, and find the Anticoagulation Therapy Visit)    Dosage adjustment made based on physician directed care plan.    Renetta Guzman RN

## 2018-12-07 NOTE — MR AVS SNAPSHOT
Naman ANDRES Jenny   12/7/2018 1:15 PM   Anticoagulation Therapy Visit    Description:  38 year old male   Provider:  CHERELLE ANTI COAG   Department:  [facility]           INR as of 12/7/2018     Today's INR 1.6!      Anticoagulation Summary as of 12/7/2018     INR goal 2.0-3.0   Today's INR 1.6!   Full warfarin instructions 12/7: 15 mg; Otherwise 10 mg every day   Next INR check 12/21/2018    Indications   Long term current use of anticoagulant therapy [Z79.01]  Deep vein thrombosis (DVT) (H) [I82.409] [I82.409]         Instructions    Some signs and symptoms of clots include: pain or tenderness in arm or leg, swelling in arm or leg, changes in skin color, or area is warm to touch, shortness or breath, trouble breathing.  Numbness or weakness especially on 1 side of the body, sudden trouble speaking or swallowing, sudden trouble seeing, sudden confusion, dizzy spells or headache.  If you have these please call 911 or seek medical care immediately.           Your next Anticoagulation Clinic appointment(s)     Dec 19, 2018  4:00 PM CST   Anticoagulation Visit with CHERELLE ANTI JOSE ANTONIO   [facility]    [address] [address] [phone]              Contact Numbers     [facility]  Please call [phone] to cancel and/or reschedule your appointment   Please call [phone] with any problems or questions regarding your therapy.        December 2018 Details    Sun Mon Tue Wed Thu Fri Sat           1                 2               3               4               5               6               7      15 mg   See details      8      10 mg           9      10 mg         10      10 mg         11      10 mg         12      10 mg         13      10 mg         14      10 mg         15      10 mg           16      10 mg         17      10 mg         18      10 mg         19      10 mg         20      10 mg         21            22                 23               24                25               26               27               28               29                 30               31                     Date Details   12/07 This INR check       Date of next INR:  12/21/2018         How to take your warfarin dose     To take:  10 mg Take 1 of the 10 mg tablets.    To take:  15 mg Take 1.5 of the 10 mg tablets.

## 2018-12-20 ENCOUNTER — ANTICOAGULATION THERAPY VISIT (OUTPATIENT)
Dept: NURSING | Facility: CLINIC | Age: 38
End: 2018-12-20
Payer: COMMERCIAL

## 2018-12-20 DIAGNOSIS — I82.409 DEEP VEIN THROMBOSIS (DVT) (H): ICD-10-CM

## 2018-12-20 DIAGNOSIS — Z79.01 LONG TERM CURRENT USE OF ANTICOAGULANT THERAPY: ICD-10-CM

## 2018-12-20 LAB — INR POINT OF CARE: 2.4 (ref 0.86–1.14)

## 2018-12-20 PROCEDURE — 36416 COLLJ CAPILLARY BLOOD SPEC: CPT

## 2018-12-20 PROCEDURE — 85610 PROTHROMBIN TIME: CPT | Mod: QW

## 2018-12-20 PROCEDURE — 99207 ZZC NO CHARGE NURSE ONLY: CPT

## 2018-12-20 NOTE — PROGRESS NOTES
ANTICOAGULATION FOLLOW-UP CLINIC VISIT    Patient Name:  Naman Alvarado  Date:  2018  Contact Type:  Face to Face    SUBJECTIVE:     Patient Findings     Positives:   No Problem Findings    Comments:   Bleeding Signs/Symptoms: NO  Thromboembolic Signs/Symptoms: NO     Medication Changes:  NO  Dietary Changes:  NO  Bacterial/Viral Infection: NO     Missed Coumadin Doses: NO  Other Concerns:  NO             OBJECTIVE    INR Protime   Date Value Ref Range Status   2018 2.4 (A) 0.86 - 1.14 Final       ASSESSMENT / PLAN  INR assessment THER    Recheck INR In: 4 WEEKS    INR Location Clinic      Anticoagulation Summary  As of 2018    INR goal:   2.0-3.0   TTR:   58.9 % (11.1 mo)   INR used for dosin.4 (2018)   Warfarin maintenance plan:   10 mg (10 mg x 1) every day   Full warfarin instructions:   10 mg every day   Weekly warfarin total:   70 mg   Plan last modified:   Emilia Hernandez RN (2018)   Next INR check:   2019   Target end date:   Indefinite    Indications    Long term current use of anticoagulant therapy [Z79.01]  Deep vein thrombosis (DVT) (H) [I82.409] [I82.409]             Anticoagulation Episode Summary     INR check location:       Preferred lab:       Send INR reminders to:   [facility]    Comments:         Anticoagulation Care Providers     Provider Role Specialty Phone number    Parth Cloud MD [facility] Internal Medicine [phone]            See the Encounter Report to view Anticoagulation Flowsheet and Dosing Calendar (Go to Encounters tab in chart review, and find the Anticoagulation Therapy Visit)        Emilia Hernandez RN

## 2018-12-20 NOTE — PATIENT INSTRUCTIONS
[facility]:  Please call [phone] to cancel and/or reschedule your appointment   Please call [phone] with any problems or questions regarding your Warfarin therapy.

## 2019-01-14 DIAGNOSIS — I42.9 CARDIOMYOPATHY, UNSPECIFIED TYPE (H): ICD-10-CM

## 2019-01-14 DIAGNOSIS — Z79.01 LONG TERM CURRENT USE OF ANTICOAGULANT THERAPY: ICD-10-CM

## 2019-01-14 RX ORDER — WARFARIN SODIUM 10 MG/1
TABLET ORAL
Qty: 90 TABLET | Refills: 0 | Status: SHIPPED | OUTPATIENT
Start: 2019-01-14 | End: 2019-06-12

## 2019-01-30 ENCOUNTER — TELEPHONE (OUTPATIENT)
Dept: FAMILY MEDICINE | Facility: CLINIC | Age: 39
End: 2019-01-30

## 2019-01-30 NOTE — TELEPHONE ENCOUNTER
Message left for patient to call the INR Clinic # at [phone]  Patient needs to schedule an INR appointment      Emilia Hernandez RN - BC

## 2019-01-30 NOTE — LETTER
March 18, 2019      Naman Alvarado  [address]  [place] [address]          Dear Naman,     Medical Alert!    Regular follow-up care while on anti-coagulation medication is vital to your health because of your underlying health condition. Your medication can prevent strokes or clots from forming. It can also cause life-threatening bleeding complicaitons IF it is not monitored on a regular basis.     After multiple attempts to contact you by phone and via mail we can no longer assume the risk to have you in our coumadin program or provide refills of your medication.    A copy of this letter will be provided to your primary physician and we ask that you please consider scheduling an appointment today by calling [phone] with Dr. Cloud. Your health is important to us and we would like to partner with you for a healthy future.      Sincerely,      Emilia Hernandez RN - [facility] Anti-coagulation Nurse

## 2019-01-30 NOTE — LETTER
February 25, 2019      Naman Alvarado  [address]  [place] [address]            Dear Naman,     Regular follow-up care while on anti-coagulation medication (Warfarin) is vital to your health because of your underlying health condition. Your medication can prevent strokes or clots from forming. It can also cause life-threatening bleeding complications IF it is not monitored on a regular basis. You are over due to have your INR checked.     Please call [phone] or [phone] to schedule an INR appointment at your earliest convenience.          Sincerely,    Emilia MONTE RN - [facility]

## 2019-02-13 NOTE — TELEPHONE ENCOUNTER
Message left for patient to call the INR Clinic # at [phone]  Patient needs to schedule an INR appointment        Emilia Hernandez RN - BC

## 2019-03-15 NOTE — TELEPHONE ENCOUNTER
Requesting to discharge patient from INR clinic due to noncompliance. Has not had INR checked since 12/20/18.     Emilia Hernandez RN - BC

## 2019-03-18 ENCOUNTER — ANTICOAGULATION THERAPY VISIT (OUTPATIENT)
Dept: FAMILY MEDICINE | Facility: CLINIC | Age: 39
End: 2019-03-18

## 2019-03-18 DIAGNOSIS — Z79.01 LONG TERM CURRENT USE OF ANTICOAGULANT THERAPY: ICD-10-CM

## 2019-03-18 DIAGNOSIS — I82.409 DEEP VEIN THROMBOSIS (DVT) (H): ICD-10-CM

## 2019-06-12 ENCOUNTER — TELEPHONE (OUTPATIENT)
Dept: FAMILY MEDICINE | Facility: CLINIC | Age: 39
End: 2019-06-12

## 2019-06-12 DIAGNOSIS — Z79.01 LONG TERM CURRENT USE OF ANTICOAGULANT THERAPY: ICD-10-CM

## 2019-06-12 DIAGNOSIS — I42.9 CARDIOMYOPATHY, UNSPECIFIED TYPE (H): ICD-10-CM

## 2019-06-13 NOTE — TELEPHONE ENCOUNTER
"Routing refill request to provider for review/approval because:  Patient needs to be seen because:  Has not had INR checked since 12/20/18 and he should have been out of warfarin awhile ago      Requested Prescriptions   Pending Prescriptions Disp Refills     warfarin (COUMADIN) 10 MG tablet [Pharmacy Med Name: WARFARIN SOD 10MG (TEN MG) TB WHITE]  Last Written Prescription Date:  1/14/19  Last Fill Quantity: 90,  # refills: 0   Last office visit: 1/5/2018 with prescribing provider:     Future Office Visit:   Next 5 appointments (look out 90 days)    Jul 08, 2019  4:10 PM CDT  Office Visit with Parth Cloud MD  [facility] ([place] [id number] [facility] [address]  [phone]          90 tablet 0     Sig: TAKE 1 TABLET(10 MG) BY MOUTH DAILY       Vitamin K Antagonists Failed - 6/12/2019  4:27 PM        Failed - INR is within goal in the past 6 weeks     Confirm INR is within goal in the past 6 weeks.     Recent Labs   Lab Test 12/20/18   INR 2.4*                       Passed - Recent (12 mo) or future (30 days) visit within the authorizing provider's specialty     Patient had office visit in the last 12 months or has a visit in the next 30 days with authorizing provider or within the authorizing provider's specialty.  See \"Patient Info\" tab in inbasket, or \"Choose Columns\" in Meds & Orders section of the refill encounter.              Passed - Medication is active on med list        Passed - Patient is 18 years of age or older        Emilia Hernandez RN - BC      "

## 2019-06-14 RX ORDER — WARFARIN SODIUM 10 MG/1
TABLET ORAL
Qty: 30 TABLET | Refills: 0 | Status: SHIPPED | OUTPATIENT
Start: 2019-06-14 | End: 2019-07-09 | Stop reason: ALTCHOICE

## 2019-06-14 NOTE — TELEPHONE ENCOUNTER
Routing to provider to advise now that patient has an appointment. Of note patient has no showed 31 out of 113 appointment.    Emilia Hernandez RN - BC

## 2019-07-08 ENCOUNTER — TELEPHONE (OUTPATIENT)
Dept: INTERNAL MEDICINE | Facility: CLINIC | Age: 39
End: 2019-07-08

## 2019-07-08 ENCOUNTER — OFFICE VISIT (OUTPATIENT)
Dept: FAMILY MEDICINE | Facility: CLINIC | Age: 39
End: 2019-07-08
Payer: COMMERCIAL

## 2019-07-08 VITALS
WEIGHT: 245 LBS | SYSTOLIC BLOOD PRESSURE: 144 MMHG | OXYGEN SATURATION: 97 % | TEMPERATURE: 98.4 F | HEIGHT: 70 IN | HEART RATE: 100 BPM | BODY MASS INDEX: 35.07 KG/M2 | RESPIRATION RATE: 18 BRPM | DIASTOLIC BLOOD PRESSURE: 96 MMHG

## 2019-07-08 DIAGNOSIS — Z13.6 CARDIOVASCULAR SCREENING; LDL GOAL LESS THAN 160: Primary | ICD-10-CM

## 2019-07-08 DIAGNOSIS — Z79.01 LONG TERM CURRENT USE OF ANTICOAGULANT THERAPY: Primary | ICD-10-CM

## 2019-07-08 DIAGNOSIS — Z11.4 ENCOUNTER FOR SCREENING FOR HIV: ICD-10-CM

## 2019-07-08 DIAGNOSIS — I42.9 CARDIOMYOPATHY, UNSPECIFIED TYPE (H): ICD-10-CM

## 2019-07-08 DIAGNOSIS — Z79.01 CHRONIC ANTICOAGULATION: ICD-10-CM

## 2019-07-08 LAB
BASOPHILS # BLD AUTO: 0 10E9/L (ref 0–0.2)
BASOPHILS NFR BLD AUTO: 0.3 %
DIFFERENTIAL METHOD BLD: NORMAL
EOSINOPHIL # BLD AUTO: 0.1 10E9/L (ref 0–0.7)
EOSINOPHIL NFR BLD AUTO: 0.9 %
ERYTHROCYTE [DISTWIDTH] IN BLOOD BY AUTOMATED COUNT: 13.5 % (ref 10–15)
HCT VFR BLD AUTO: 42.9 % (ref 40–53)
HGB BLD-MCNC: 14.9 G/DL (ref 13.3–17.7)
LYMPHOCYTES # BLD AUTO: 1.5 10E9/L (ref 0.8–5.3)
LYMPHOCYTES NFR BLD AUTO: 18.7 %
MCH RBC QN AUTO: 32 PG (ref 26.5–33)
MCHC RBC AUTO-ENTMCNC: 34.7 G/DL (ref 31.5–36.5)
MCV RBC AUTO: 92 FL (ref 78–100)
MONOCYTES # BLD AUTO: 0.7 10E9/L (ref 0–1.3)
MONOCYTES NFR BLD AUTO: 8.7 %
NEUTROPHILS # BLD AUTO: 5.6 10E9/L (ref 1.6–8.3)
NEUTROPHILS NFR BLD AUTO: 71.4 %
PLATELET # BLD AUTO: 265 10E9/L (ref 150–450)
RBC # BLD AUTO: 4.65 10E12/L (ref 4.4–5.9)
WBC # BLD AUTO: 7.8 10E9/L (ref 4–11)

## 2019-07-08 PROCEDURE — 99214 OFFICE O/P EST MOD 30 MIN: CPT | Performed by: INTERNAL MEDICINE

## 2019-07-08 PROCEDURE — 80061 LIPID PANEL: CPT | Performed by: INTERNAL MEDICINE

## 2019-07-08 PROCEDURE — 80048 BASIC METABOLIC PNL TOTAL CA: CPT | Performed by: INTERNAL MEDICINE

## 2019-07-08 PROCEDURE — 36415 COLL VENOUS BLD VENIPUNCTURE: CPT | Performed by: INTERNAL MEDICINE

## 2019-07-08 PROCEDURE — 84460 ALANINE AMINO (ALT) (SGPT): CPT | Performed by: INTERNAL MEDICINE

## 2019-07-08 PROCEDURE — 87389 HIV-1 AG W/HIV-1&-2 AB AG IA: CPT | Performed by: INTERNAL MEDICINE

## 2019-07-08 PROCEDURE — 85025 COMPLETE CBC W/AUTO DIFF WBC: CPT | Performed by: INTERNAL MEDICINE

## 2019-07-08 ASSESSMENT — MIFFLIN-ST. JEOR: SCORE: 2043.59

## 2019-07-08 NOTE — TELEPHONE ENCOUNTER
Parth Cloud MD  [facility] Rn Triage Pool             Can you please help me with place some orders regarding Xarelto (Rivaroxaban)  ? This is exactly as documented below and I want you to Please let patient know the plan [ he expects to be called on his cell phone ]. Please reroute if any additional questions for me or help with signing of orders.     Parth Cloud MD      Previous Messages      ----- Message -----   From: Bela Lange formerly [facility]   Sent: 7/8/2019   5:19 PM   To: Parth Cloud MD     Yes, I think Xarelto would be a good option as it eventually gets to a once daily dose without added monitoring. To transition you would stop warfarin and once INR < 2, start Xarelto.     Xarelto dosing: DVT/PE treatment/prevention of recurrence (15 mg twice daily [with food] x 3 weeks, then 20 mg once daily [with food to improve absorption] for at least six months, then 10 mg once daily)       Hope this helps.   Donna Lange, PharmD   Medication Therapy Management Pharmacist   [phone]       ----- Message -----   From: Parth Cloud MD   Sent: 7/8/2019   4:52 PM   To: Bela Lange formerly [facility]     Do you think we can safely convert this patient over to one of the direct oral anticoagulants (DOACs)  From coumadin ?

## 2019-07-08 NOTE — PATIENT INSTRUCTIONS
We reviewed your history of extensive deep vein thrombosis and longterm treatment with coumadin.     I am recommending that we change you over to a medication called Xarelto (Rivaroxaban) [ or possibly Apixaban (Eliquis) ].    The big benefit with this treatment is there is no reason or need for INR / coumadin monitoring . We could provide a year prescription and see you back then.    Further follow up after I review your case with the medication therapy management pharmacist later today and we can reach you hope tomorrow     Parth Cloud MD

## 2019-07-08 NOTE — LETTER
My Heart Failure Action Plan   Name: Naman Alvarado    YOB: 1980   Date: 7/7/2019    My doctor: Parth Cloud     [address]  [place] [address] [phone]  My Diagnosis: Combined - EF: 30% - 34%   My Exercise Goal: 30 minutes daily  .     My Weight Plan:   Wt Readings from Last 2 Encounters:   01/05/18 108 kg (238 lb 3.2 oz)   12/15/17 108 kg (238 lb)     Weigh yourself daily using the same scale. If you gain more than 2 pounds in 24 hours or 5 pounds in a week call the clinic    My Diet Goal: No added salt    Emergency Room Visits:    Our goal is to improve your quality of life and help you avoid a visit to the emergency room or hospital.  If we work together, we can achieve this goal. But, if you feel you need to call 911 or go to the emergency room, please do so.  If you go to the emergency room, please bring your list of medicines and your daily weight chart with you.       GREEN ZONE     Doing well today    Weight gained is no more than 2 pounds a day or 5 pounds a week.    No swelling in feet, ankles, legs or stomach.    No more swelling than usual.    No more trouble breathing than usual.    No change in my sleep.    No other problems. Actions:    I am doing fine.  I will take my medicine, follow my diet, see my doctor, exercise, and watch for symptoms.           YELLOW ZONE         Having a bad day or flare up    Weight gain of more than 2 pounds in one day or 5 pounds in one week.    New swelling in ankle, leg, knee or thigh.    Bloating in belly, pants feel tighter.    Swelling in hands or face.    Coughing or trouble breathing while walking or talking.    Harder to breathe last night.    Have trouble sleeping, wake up short of breath.    Much more tired than usual.    Not eating.    Pain in my chest or bad leg cramps.    Feel weak or dizzy. Actions:    I need to take action and call my doctor or nurse today.                 RED ZONE         Need  medical care now    Weight gain of 5 pounds overnight.    Chest pain or pressure that does not go away.    Feel less alert.    Wheezing or have trouble breathing when at rest.    Cannot sleep lying down.    Cannot take my water pill.    Pass out or faint. Actions:    I need to call my doctor or nurse now!    Call 911 if I have chest pain or cannot breathe.

## 2019-07-08 NOTE — TELEPHONE ENCOUNTER
"Per [facility] pharmacist's note below, \"To transition you would stop warfarin and once INR < 2, start Xarelto\"  Patient had been discharged from the INR clinic in March 2019 due to noncompliance (see 6/12/19 phone encounter)    Please advise if patient should stop warfarin now and when should he get INR rechecked (at the lab)?  Do we wait until INR is under 2 before sending Xarelto prescription in?  Lab cannot add an INR to the labs drawn today  Would need to be drawn in a different tube or order INR finger prick to be done at another lab appointment     Andrea Meek RN    "

## 2019-07-08 NOTE — PROGRESS NOTES
"Subjective     Naman Alvarado is a 38 year old male who presents to clinic today for the following health issues:    HPI   Chief Complaint   Patient presents with     INR Followup     referral for inr clinic      BP Readings from Last 6 Encounters:   07/08/19 (!) 144/96   01/05/18 130/88   12/15/17 130/78   02/04/16 132/79   09/25/14 134/80   09/16/13 102/82     Last appointment with me was 1-5-2018    Preventative healthcare maintenance goals including      Health Maintenance   Topic Date Due     HF ACTION PLAN  1982     HIV SCREENING  07/27/1997     OP ANNUAL INR REFERRAL  09/17/2014     PREVENTIVE CARE VISIT  02/04/2017     BMP  06/15/2018     ALT  12/15/2018     LIPID  12/15/2018     CBC  12/15/2018     PHQ-2  01/01/2019     INFLUENZA VACCINE (1) 09/01/2019     DTAP/TDAP/TD IMMUNIZATION (9 - Td) 12/09/2020     IPV IMMUNIZATION  Completed     MENINGITIS IMMUNIZATION  Aged Out     Listed in his care everywhere documentation he is said to have a cardiomyopathy in 2009 [ idiopathic dilated cardiomyopathy ]. Patient has described himself as asymptomatic and has no active symptomatology or follow up currently with cardiology and is on no medications for this. Only coumadin . See care everywhere for 2009 echocardiogram which had significant wall motion abnormalities consistent with said diagnosis. Left ventricular ejection fraction was 34% 10 years ago.    Heart failure action plan [printed]     I suggested to patient we might want to proceed with a recheck echocardiogram at some point but he isn't interested at this point.    He has been on longterm treatment with coumadin secondary to his history of extensive deep vein thrombosis. However due to repeated problems with noncompliant behavior with the INR clinic he has benefit terminated from that clinic more then once. At our most recent previous office visit with me I had \" laid down the law\" as much as possible and yet still here we are at essentially the exact " same place 18 months later.    Patient Active Problem List   Diagnosis     MEDICAL HISTORY OF -     CARDIOVASCULAR SCREENING; LDL GOAL LESS THAN 160     Chronic anticoagulation     Long term current use of anticoagulant therapy     Poor compliance with medication     Chronic deep vein thrombosis (DVT) of femoral vein of left lower extremity (H)     Cardiomyopathy, unspecified type (H)     Deep vein thrombosis (DVT) (H) [I82.409]     Past Surgical History:   Procedure Laterality Date     SURGICAL HISTORY OF -       Multiple procedures for DVT's at the [facility]       Social History     Tobacco Use     Smoking status: Former Smoker     Years: 4.00     Last attempt to quit: 2003     Years since quittin.5     Smokeless tobacco: Never Used     Tobacco comment: 1 pack per week   Substance Use Topics     Alcohol use: Yes     Comment: OCCASIONALLY     Family History   Problem Relation Age of Onset     Hypertension Mother      Neurologic Disorder Father      Alzheimer Disease Maternal Grandmother      Heart Disease Paternal Grandfather          Current Outpatient Medications   Medication Sig Dispense Refill     warfarin (COUMADIN) 10 MG tablet TAKE 1 TABLET(10 MG) BY MOUTH DAILY 30 tablet 0     warfarin (COUMADIN) 2.5 MG tablet Take 1 tablet (2.5 mg) by mouth Every Mon, Wed, Fri Morning To accompany coumadin doses with 10 milligram tabs to = 12.5 milligrams Monday, Wednesday, and Friday schedule and 10 milligrams other days (Patient not taking: Reported on 2019) 30 tablet 3     Allergies   Allergen Reactions     Nkda [No Known Drug Allergies]      Recent Labs   Lab Test 12/15/17  1248 16  1716 14  1732 13  1725   *  --  142* 145*   HDL 52  --  57 54   TRIG 88  --   --  61   ALT 40  --  22 23   CR 0.96 0.73 0.87 0.92   GFRESTIMATED 89 >90  Non  GFR Calc   >90  Non  GFR Calc   >90   GFRESTBLACK >90 >90   GFR Calc   >90    "American GFR Calc   >90   POTASSIUM 3.8 3.8 4.0 4.5   TSH  --   --   --  2.02      BP Readings from Last 3 Encounters:   07/08/19 (!) 144/96   01/05/18 130/88   12/15/17 130/78    Wt Readings from Last 3 Encounters:   07/08/19 111.1 kg (245 lb)   01/05/18 108 kg (238 lb 3.2 oz)   12/15/17 108 kg (238 lb)            Reviewed and updated as needed this visit by Provider         Review of Systems   ROS COMP: Constitutional, HEENT, cardiovascular, pulmonary, gi and gu systems are negative, except as otherwise noted.      Objective    BP (!) 144/96   Pulse 100   Temp 98.4  F (36.9  C) (Oral)   Resp 18   Ht 1.772 m (5' 9.75\")   Wt 111.1 kg (245 lb)   SpO2 97%   BMI 35.41 kg/m    Body mass index is 35.41 kg/m .  Physical Exam   GENERAL: healthy, alert and no distress  NECK: no adenopathy, no asymmetry, masses, or scars and thyroid normal to palpation  RESP: lungs clear to auscultation - no rales, rhonchi or wheezes  CV: regular rate and rhythm, normal S1 S2, no S3 or S4, no murmur, click or rub, no peripheral edema and peripheral pulses strong  MS: no gross musculoskeletal defects noted, no edema    Diagnostic Test Results:  Labs reviewed in Epic  Orders Placed This Encounter   Procedures     HIV Antigen Antibody Combo     Basic metabolic panel     ALT     Lipid panel reflex to direct LDL Non-fasting     CBC with platelets differential             Assessment & Plan     (Z13.6) CARDIOVASCULAR SCREENING; LDL GOAL LESS THAN 160  (primary encounter diagnosis)  Comment: today we had a good face to face encounter and reviewed his entire situation . In my opinion the best possible world is to convert this patient over to one of the direct oral anticoagulants (DOACs) specifically Xarelto (Rivaroxaban) versus Apixaban (Eliquis). See as detailed below   Plan: Lipid panel reflex to direct LDL Non-fasting        Follow up as indicated on results     (Z79.01) Chronic anticoagulation  Comment: as detailed above - I am asking " "medication therapy management pharmacist for an opinion on current literature for a patient like this prior to making the change   Plan: as above     (I42.9) Cardiomyopathy, unspecified type (H)  Comment: as detailed above   Plan: Basic metabolic panel, ALT, CBC with platelets         differential            (Z11.4) Encounter for screening for HIV  Comment: routine screening times 1  Plan: HIV Antigen Antibody Combo         Elevated BP without a diagnosis of hypertension - he is instructed to return to clinic for an medical assistant blood pressure recheck  In 3-6 weeks       BMI:   Estimated body mass index is 35.41 kg/m  as calculated from the following:    Height as of this encounter: 1.772 m (5' 9.75\").    Weight as of this encounter: 111.1 kg (245 lb).   Weight management plan: Discussed healthy diet and exercise guidelines        See Patient Instructions    No follow-ups on file.    Parth Cloud MD  [facility]      "

## 2019-07-08 NOTE — Clinical Note
Do you think we can safely convert this patient over to one of the direct oral anticoagulants (DOACs)  From coumadin ?

## 2019-07-09 LAB
ALT SERPL W P-5'-P-CCNC: 54 U/L (ref 0–70)
ANION GAP SERPL CALCULATED.3IONS-SCNC: 8 MMOL/L (ref 3–14)
BUN SERPL-MCNC: 14 MG/DL (ref 7–30)
CALCIUM SERPL-MCNC: 8.9 MG/DL (ref 8.5–10.1)
CHLORIDE SERPL-SCNC: 103 MMOL/L (ref 94–109)
CHOLEST SERPL-MCNC: 270 MG/DL
CO2 SERPL-SCNC: 26 MMOL/L (ref 20–32)
CREAT SERPL-MCNC: 0.99 MG/DL (ref 0.66–1.25)
GFR SERPL CREATININE-BSD FRML MDRD: >90 ML/MIN/{1.73_M2}
GLUCOSE SERPL-MCNC: 91 MG/DL (ref 70–99)
HDLC SERPL-MCNC: 46 MG/DL
HIV 1+2 AB+HIV1 P24 AG SERPL QL IA: NONREACTIVE
LDLC SERPL CALC-MCNC: 197 MG/DL
NONHDLC SERPL-MCNC: 224 MG/DL
POTASSIUM SERPL-SCNC: 4.2 MMOL/L (ref 3.4–5.3)
SODIUM SERPL-SCNC: 137 MMOL/L (ref 133–144)
TRIGL SERPL-MCNC: 135 MG/DL

## 2019-07-09 NOTE — TELEPHONE ENCOUNTER
I recommend patient stop coumadin now. Send in new prescription for Xarelto (Rivaroxaban)  With directions as already documented.    He can just start the Xarelto (Rivaroxaban) after 5 days off of coumadin.    Parth Cloud MD

## 2019-07-09 NOTE — TELEPHONE ENCOUNTER
Patient notified of providers message as written.   Patient verbalized understanding and has no further questions or concerns.  Emilia Hernandez RN - BC

## 2019-10-04 ENCOUNTER — HEALTH MAINTENANCE LETTER (OUTPATIENT)
Age: 39
End: 2019-10-04

## 2020-02-29 DIAGNOSIS — Z79.01 LONG TERM CURRENT USE OF ANTICOAGULANT THERAPY: ICD-10-CM

## 2020-03-03 RX ORDER — RIVAROXABAN 20 MG/1
TABLET, FILM COATED ORAL
Qty: 90 TABLET | Refills: 1 | Status: SHIPPED | OUTPATIENT
Start: 2020-03-03 | End: 2020-10-26

## 2020-03-03 NOTE — TELEPHONE ENCOUNTER
Routing refill request to provider for review/approval because:  Labs not current:  Cr, CrCl      Requested Prescriptions   Pending Prescriptions Disp Refills     XARELTO ANTICOAGULANT 20 MG TABS tablet [Pharmacy Med Name: XARELTO 20MG TABLETS] 90 tablet 1     Sig: TAKE 1 TABLET(20 MG) BY MOUTH DAILY WITH DINNER. START AFTER DONE WITH 2 TIMES DAILY DOSING IS FINISHED       Direct Oral Anticoagulant Agents Failed - 3/2/2020 10:53 AM        Failed - Creatinine Clearance greater than 50 ml/min on file in past 3 mos     No lab results found.          Failed - Serum creatinine less than or equal to 1.4 on file in past 3 mos     Recent Labs   Lab Test 07/08/19  1646   CR 0.99             Failed - Recent (6 mo) or future (30 days) visit within the authorizing provider's specialty        Passed - Normal Platelets on file in past 12 months     Recent Labs   Lab Test 07/08/19  1646                  Passed - Medication is active on med list        Passed - Patient is 18 years of age or older

## 2020-10-25 DIAGNOSIS — Z79.01 LONG TERM CURRENT USE OF ANTICOAGULANT THERAPY: ICD-10-CM

## 2020-10-26 NOTE — TELEPHONE ENCOUNTER
Routing refill request to provider for review/approval because:  Labs not current:  Cr, platelets  Patient needs to be seen because it has been more than 1 year since last office visit.  Natasha Villanueva BSN, RN

## 2020-11-07 ENCOUNTER — HEALTH MAINTENANCE LETTER (OUTPATIENT)
Age: 40
End: 2020-11-07

## 2021-01-18 DIAGNOSIS — Z79.01 LONG TERM CURRENT USE OF ANTICOAGULANT THERAPY: ICD-10-CM

## 2021-01-21 RX ORDER — RIVAROXABAN 20 MG/1
TABLET, FILM COATED ORAL
Qty: 30 TABLET | Refills: 0 | OUTPATIENT
Start: 2021-01-21

## 2021-01-21 NOTE — TELEPHONE ENCOUNTER
Routing refill request to provider for review/approval because:  Labs not current:  Cr.   Patient needs to be seen because it has been more than 1 year since last office visit. Last OV was on 7/8/19  Requested Prescriptions   Pending Prescriptions Disp Refills     XARELTO ANTICOAGULANT 20 MG TABS tablet [Pharmacy Med Name: XARELTO 20MG TABLETS] 30 tablet 0     Sig: TAKE 1 TABLET BY MOUTH DAILY WITH DINNER       Direct Oral Anticoagulant Agents Failed - 1/18/2021 11:12 AM        Failed - Normal Platelets on file in past 12 months     Recent Labs   Lab Test 07/08/19  1646                  Failed - Creatinine Clearance greater than 50 ml/min on file in past 3 mos     No lab results found.          Failed - Serum creatinine less than or equal to 1.4 on file in past 3 mos     Recent Labs   Lab Test 07/08/19  1646   CR 0.99       Ok to refill medication if creatinine is low          Failed - Recent (6 mo) or future (30 days) visit within the authorizing provider's specialty        Passed - Medication is active on med list        Passed - Patient is 18 years of age or older             Nneka Knott RN

## 2021-01-26 NOTE — TELEPHONE ENCOUNTER
Called patient and informed of message and made appointment next week, patient is completely out of medication, please refill medication.      Kristin RODRÍGUEZ CMA ([facility])

## 2021-02-04 ENCOUNTER — IMMUNIZATION (OUTPATIENT)
Dept: NURSING | Facility: CLINIC | Age: 41
End: 2021-02-04
Payer: COMMERCIAL

## 2021-02-04 ENCOUNTER — OFFICE VISIT (OUTPATIENT)
Dept: INTERNAL MEDICINE | Facility: CLINIC | Age: 41
End: 2021-02-04
Payer: COMMERCIAL

## 2021-02-04 VITALS
HEIGHT: 70 IN | OXYGEN SATURATION: 99 % | SYSTOLIC BLOOD PRESSURE: 142 MMHG | RESPIRATION RATE: 14 BRPM | WEIGHT: 260 LBS | TEMPERATURE: 98.9 F | HEART RATE: 101 BPM | DIASTOLIC BLOOD PRESSURE: 100 MMHG | BODY MASS INDEX: 37.22 KG/M2

## 2021-02-04 DIAGNOSIS — Z79.01 LONG TERM CURRENT USE OF ANTICOAGULANT THERAPY: Primary | ICD-10-CM

## 2021-02-04 DIAGNOSIS — I10 ESSENTIAL HYPERTENSION WITH GOAL BLOOD PRESSURE LESS THAN 140/90: ICD-10-CM

## 2021-02-04 DIAGNOSIS — E66.01 MORBID OBESITY (H): ICD-10-CM

## 2021-02-04 DIAGNOSIS — Z23 NEED FOR DIPHTHERIA-TETANUS-PERTUSSIS (TDAP) VACCINE: ICD-10-CM

## 2021-02-04 PROCEDURE — 0001A PR COVID VAC PFIZER DIL RECON 30 MCG/0.3 ML IM: CPT

## 2021-02-04 PROCEDURE — 99214 OFFICE O/P EST MOD 30 MIN: CPT | Performed by: INTERNAL MEDICINE

## 2021-02-04 PROCEDURE — 91300 PR COVID VAC PFIZER DIL RECON 30 MCG/0.3 ML IM: CPT

## 2021-02-04 ASSESSMENT — MIFFLIN-ST. JEOR: SCORE: 2097.66

## 2021-02-04 NOTE — PROGRESS NOTES
"  Assessment & Plan     Long term current use of anticoagulant therapy  This patient was successfully changed from coumadin to Xarelto (Rivaroxaban)  With great results. He recognizes the increased cost of this medication but greatly favors paying a higher priace to not have to come in for ongoing monitoring  . This is 18 months however and once a year is what's needed  - rivaroxaban ANTICOAGULANT (XARELTO ANTICOAGULANT) 20 MG TABS tablet; Take 1 tablet (20 mg) by mouth daily (with dinner)    Need for diphtheria-tetanus-pertussis (Tdap) vaccine  Administered   - TDAP VACCINE (Adacel, Boostrix)  [[id number]]    Hypertension - his elevated blood pressure readings was addressed. It's likely related to his problem of increased weight and decreased exercise secondary to Coronavirus (COVID-19) situation. I argued in significant detail  The risks to him of eventual longterm cerebrovascular accident or heart attack. He prefers to following the advice I gave for treating hypertension without medication however the key here is he needs a follow up appointment in 3 months. See patient after-visit summary     Obesity - CO-MORBID CONDITIONS for a diagnosis of morbid obesity : CHF, DM, impaired fasting glucose/metabolic syndrome, GB disease, GERD, hyperlipidemia, HTN, obstructive sleep apnea, osteoarthritis in weight bearing joint       25 minutes spent on the date of the encounter doing chart review, history and exam, documentation and further activities as noted above     BMI:   Estimated body mass index is 37.84 kg/m  as calculated from the following:    Height as of this encounter: 1.765 m (5' 9.5\").    Weight as of this encounter: 117.9 kg (260 lb).   Weight management plan: Discussed healthy diet and exercise guidelines      Return in about 3 months (around 5/4/2021).    Parth Cloud MD  [facility] KEVAN Florence is a 40 year old who presents to clinic today for the following health issues " "    HPI   Chief Complaint   Patient presents with     Recheck Medication     BP Readings from Last 6 Encounters:   02/04/21 (!) 142/100   07/08/19 (!) 144/96   01/05/18 130/88   12/15/17 130/78   02/04/16 132/79   09/25/14 134/80     Wt Readings from Last 5 Encounters:   02/04/21 117.9 kg (260 lb)   07/08/19 111.1 kg (245 lb)   01/05/18 108 kg (238 lb 3.2 oz)   12/15/17 108 kg (238 lb)   02/04/16 100.2 kg (220 lb 12.8 oz)     See patient after-visit summary       Review of Systems   Constitutional, HEENT, cardiovascular, pulmonary, gi and gu systems are negative, except as otherwise noted.      Objective    BP (!) 142/100   Pulse 101   Temp 98.9  F (37.2  C) (Oral)   Resp 14   Ht 1.765 m (5' 9.5\")   Wt 117.9 kg (260 lb)   SpO2 99%   BMI 37.84 kg/m    Body mass index is 37.84 kg/m .  Physical Exam   GENERAL: healthy, alert and no distress  EYES: Eyes grossly normal to inspection, PERRL and conjunctivae and sclerae normal  MS: no gross musculoskeletal defects noted, no edema  NEURO: Normal strength and tone, mentation intact and speech normal  PSYCH: mentation appears normal, affect normal/bright      I spent a total of 25 minutes face-to-face with Naman Alvarado during today's office visit.  Over 50% of this time was spent counseling the patient and/or coordinating care regarding   Encounter Diagnoses   Name Primary?     Long term current use of anticoagulant therapy Yes     Need for diphtheria-tetanus-pertussis (Tdap) vaccine     .  See note for details.        "

## 2021-02-04 NOTE — PATIENT INSTRUCTIONS
5 issues surrounding treatment of hypertension     1) no smoking, quit right away   2) excess alcohol  , needs to be reduced   3) cut back or reduce salt intake ?  4) if sedentary, need to engage in structured exercise program   5) if overweight, work on weight loss [ combination of exercise and diet efforts ]    6] if mean / average blood pressure is above the upper limit of recommended guidelines of 140/90 then starting / increasing  medication is appropriate to strive to keep blood pressure within recommended treatment     We agreed to do a follow up blood pressure recheck in 3 months and see how things go between now and then

## 2021-02-25 ENCOUNTER — IMMUNIZATION (OUTPATIENT)
Dept: NURSING | Facility: CLINIC | Age: 41
End: 2021-02-25
Attending: FAMILY MEDICINE
Payer: COMMERCIAL

## 2021-02-25 PROCEDURE — 91300 PR COVID VAC PFIZER DIL RECON 30 MCG/0.3 ML IM: CPT

## 2021-02-25 PROCEDURE — 0002A PR COVID VAC PFIZER DIL RECON 30 MCG/0.3 ML IM: CPT

## 2021-09-11 ENCOUNTER — HEALTH MAINTENANCE LETTER (OUTPATIENT)
Age: 41
End: 2021-09-11

## 2022-01-01 ENCOUNTER — HEALTH MAINTENANCE LETTER (OUTPATIENT)
Age: 42
End: 2022-01-01

## 2022-05-09 DIAGNOSIS — Z79.01 LONG TERM CURRENT USE OF ANTICOAGULANT THERAPY: ICD-10-CM

## 2022-05-09 NOTE — TELEPHONE ENCOUNTER
Routing refill request to provider for review/approval because:  Teresa given x1.     Writer moved pt's appointment up to 5/19/22 (from 6/6/22). Please send refill to last until pt can come for his appointment.    Thank you and take care,    Elise ROTH RN, BSN  [facility]

## 2022-05-09 NOTE — TELEPHONE ENCOUNTER
Reason for Call:  Medication or medication refill:    Do you use a [facility]?  Name of the pharmacy and phone number for the current request:  [facility] [phone]    Name of the medication requested: rivaroxaban ANTICOAGULANT (XARELTO ANTICOAGULANT) 20 MG TABS tablet    Other request: patient has made appointment for Physical. Next available was 6/6/22. Patient is out of medication.    Can we leave a detailed message on this number? YES    Phone number patient can be reached at: Cell number on file:    Telephone Information:   Mobile [phone]       Best Time: anytime    Call taken on 5/9/2022 at 4:04 PM by Rajani Lopez

## 2022-05-19 ENCOUNTER — OFFICE VISIT (OUTPATIENT)
Dept: INTERNAL MEDICINE | Facility: CLINIC | Age: 42
End: 2022-05-19
Payer: COMMERCIAL

## 2022-05-19 VITALS
OXYGEN SATURATION: 97 % | BODY MASS INDEX: 38.22 KG/M2 | HEIGHT: 70 IN | WEIGHT: 267 LBS | HEART RATE: 104 BPM | DIASTOLIC BLOOD PRESSURE: 94 MMHG | SYSTOLIC BLOOD PRESSURE: 136 MMHG | TEMPERATURE: 98.5 F | RESPIRATION RATE: 14 BRPM

## 2022-05-19 DIAGNOSIS — Z13.6 CARDIOVASCULAR SCREENING; LDL GOAL LESS THAN 160: ICD-10-CM

## 2022-05-19 DIAGNOSIS — I42.9 CARDIOMYOPATHY, UNSPECIFIED TYPE (H): ICD-10-CM

## 2022-05-19 DIAGNOSIS — I10 ESSENTIAL HYPERTENSION WITH GOAL BLOOD PRESSURE LESS THAN 140/90: ICD-10-CM

## 2022-05-19 DIAGNOSIS — E66.01 MORBID OBESITY (H): ICD-10-CM

## 2022-05-19 DIAGNOSIS — E04.9 GOITER: ICD-10-CM

## 2022-05-19 DIAGNOSIS — Z79.01 LONG TERM CURRENT USE OF ANTICOAGULANT THERAPY: ICD-10-CM

## 2022-05-19 DIAGNOSIS — Z23 NEED FOR DIPHTHERIA-TETANUS-PERTUSSIS (TDAP) VACCINE: ICD-10-CM

## 2022-05-19 DIAGNOSIS — Z71.89 ADVANCED DIRECTIVES, COUNSELING/DISCUSSION: ICD-10-CM

## 2022-05-19 DIAGNOSIS — Z00.00 ROUTINE HISTORY AND PHYSICAL EXAMINATION OF ADULT: Primary | ICD-10-CM

## 2022-05-19 LAB
ANION GAP SERPL CALCULATED.3IONS-SCNC: 7 MMOL/L (ref 3–14)
BUN SERPL-MCNC: 10 MG/DL (ref 7–30)
CALCIUM SERPL-MCNC: 9.3 MG/DL (ref 8.5–10.1)
CHLORIDE BLD-SCNC: 105 MMOL/L (ref 94–109)
CHOLEST SERPL-MCNC: 238 MG/DL
CO2 SERPL-SCNC: 25 MMOL/L (ref 20–32)
CREAT SERPL-MCNC: 0.92 MG/DL (ref 0.66–1.25)
FASTING STATUS PATIENT QL REPORTED: NO
GFR SERPL CREATININE-BSD FRML MDRD: >90 ML/MIN/1.73M2
GLUCOSE BLD-MCNC: 97 MG/DL (ref 70–99)
HDLC SERPL-MCNC: 47 MG/DL
HGB BLD-MCNC: 15.1 G/DL (ref 13.3–17.7)
LDLC SERPL CALC-MCNC: 167 MG/DL
NONHDLC SERPL-MCNC: 191 MG/DL
POTASSIUM BLD-SCNC: 3.9 MMOL/L (ref 3.4–5.3)
SODIUM SERPL-SCNC: 137 MMOL/L (ref 133–144)
TRIGL SERPL-MCNC: 119 MG/DL
TSH SERPL DL<=0.005 MIU/L-ACNC: 1.82 MU/L (ref 0.4–4)

## 2022-05-19 PROCEDURE — 84443 ASSAY THYROID STIM HORMONE: CPT | Performed by: INTERNAL MEDICINE

## 2022-05-19 PROCEDURE — 36415 COLL VENOUS BLD VENIPUNCTURE: CPT | Performed by: INTERNAL MEDICINE

## 2022-05-19 PROCEDURE — 90471 IMMUNIZATION ADMIN: CPT | Performed by: INTERNAL MEDICINE

## 2022-05-19 PROCEDURE — 90715 TDAP VACCINE 7 YRS/> IM: CPT | Performed by: INTERNAL MEDICINE

## 2022-05-19 PROCEDURE — 99395 PREV VISIT EST AGE 18-39: CPT | Mod: 25 | Performed by: INTERNAL MEDICINE

## 2022-05-19 PROCEDURE — 85018 HEMOGLOBIN: CPT | Performed by: INTERNAL MEDICINE

## 2022-05-19 PROCEDURE — 80048 BASIC METABOLIC PNL TOTAL CA: CPT | Performed by: INTERNAL MEDICINE

## 2022-05-19 PROCEDURE — 99214 OFFICE O/P EST MOD 30 MIN: CPT | Mod: 25 | Performed by: INTERNAL MEDICINE

## 2022-05-19 PROCEDURE — 80061 LIPID PANEL: CPT | Performed by: INTERNAL MEDICINE

## 2022-05-19 RX ORDER — LOSARTAN POTASSIUM 25 MG/1
25 TABLET ORAL DAILY
Qty: 90 TABLET | Refills: 1 | Status: SHIPPED | OUTPATIENT
Start: 2022-05-19 | End: 2022-12-15

## 2022-05-19 NOTE — LETTER
May 20, 2022      Naman Alvarado  [address] [place] [address]        Dear ,    We are writing to inform you of your test results.    All of these tests are within acceptable limits , things look good !         Resulted Orders   Lipid panel reflex to direct LDL Non-fasting   Result Value Ref Range    Cholesterol 238 (H) <200 mg/dL    Triglycerides 119 <150 mg/dL    Direct Measure HDL 47 >=40 mg/dL    LDL Cholesterol Calculated 167 (H) <=100 mg/dL    Non HDL Cholesterol 191 (H) <130 mg/dL    Patient Fasting > 8hrs? No     Narrative    Cholesterol  Desirable:  <200 mg/dL    Triglycerides  Normal:  Less than 150 mg/dL  Borderline High:  150-199 mg/dL  High:  200-499 mg/dL  Very High:  Greater than or equal to 500 mg/dL    Direct Measure HDL  Female:  Greater than or equal to 50 mg/dL   Male:  Greater than or equal to 40 mg/dL    LDL Cholesterol  Desirable:  <100mg/dL  Above Desirable:  100-129 mg/dL   Borderline High:  130-159 mg/dL   High:  160-189 mg/dL   Very High:  >= 190 mg/dL    Non HDL Cholesterol  Desirable:  130 mg/dL  Above Desirable:  130-159 mg/dL  Borderline High:  160-189 mg/dL  High:  190-219 mg/dL  Very High:  Greater than or equal to 220 mg/dL   Hemoglobin   Result Value Ref Range    Hemoglobin 15.1 13.3 - 17.7 g/dL   Basic metabolic panel  (Ca, Cl, CO2, Creat, Gluc, K, Na, BUN)   Result Value Ref Range    Sodium 137 133 - 144 mmol/L    Potassium 3.9 3.4 - 5.3 mmol/L    Chloride 105 94 - 109 mmol/L    Carbon Dioxide (CO2) 25 20 - 32 mmol/L    Anion Gap 7 3 - 14 mmol/L    Urea Nitrogen 10 7 - 30 mg/dL    Creatinine 0.92 0.66 - 1.25 mg/dL    Calcium 9.3 8.5 - 10.1 mg/dL    Glucose 97 70 - 99 mg/dL    GFR Estimate >90 >60 mL/min/1.73m2      Comment:      Effective December 21, 2021 eGFRcr in adults is calculated using the 2021 CKD-EPI creatinine equation which includes age and gender (Tj fowler al., NEJ, DOI: 10.1056/KTIXuy1576534)   TSH with free T4 reflex   Result Value Ref Range    TSH  1.82 0.40 - 4.00 mU/L       If you have any questions or concerns, please call the clinic at the number listed above.       Sincerely,      Parth Cloud MD/arthur

## 2022-05-19 NOTE — PROGRESS NOTES
SUBJECTIVE:   CC: Naman Alvarado is an 41 year old male who presents for preventative health visit.       Patient has been advised of split billing requirements and indicates understanding: Yes     Healthy Habits:    Getting at least 3 servings of Calcium per day:  Yes    Bi-annual eye exam:  Yes    Dental care twice a year:  NO    Sleep apnea or symptoms of sleep apnea:  None    Diet:  Regular (no restrictions)    Frequency of exercise:  2-3 days/week    Duration of exercise:  15-30 minutes    Taking medications regularly:  Yes    Barriers to taking medications:  None    Medication side effects:  None    PHQ-2 Total Score:    Additional concerns today:  No    Wt Readings from Last 5 Encounters:   05/19/22 121.1 kg (267 lb)   02/04/21 117.9 kg (260 lb)   07/08/19 111.1 kg (245 lb)   01/05/18 108 kg (238 lb 3.2 oz)   12/15/17 108 kg (238 lb)     Body mass index is 38.59 kg/m .     BP Readings from Last 6 Encounters:   05/19/22 (!) 136/94   02/04/21 (!) 142/100   07/08/19 (!) 144/96   01/05/18 130/88   12/15/17 130/78   02/04/16 132/79        He has achieved morbid obesity due to his body mass index and his co-morbidities of hypertension     Exercise recommended   Diet mediterranean diet is recommended     Last appointment with me was 2-4-2021    Health Maintenance Due   Topic Date Due     ANNUAL REVIEW OF HM ORDERS  Never done     ADVANCE CARE PLANNING  Never done     PREVENTIVE CARE VISIT  02/04/2017     DTAP/TDAP/TD IMMUNIZATION (10 - Td or Tdap) 12/09/2020     PHQ-2 (once per calendar year)  01/01/2022          Today's PHQ-2 Score:   PHQ-2 ( 1999 Pfizer) 12/15/2017   Q1: Little interest or pleasure in doing things 0   Q2: Feeling down, depressed or hopeless 0   PHQ-2 Score 0       Abuse: Current or Past(Physical, Sexual or Emotional)- No  Do you feel safe in your environment? Yes        Social History     Tobacco Use     Smoking status: Former Smoker     Years: 4.00     Quit date: 1/1/2003     Years since quitting:  19.3     Smokeless tobacco: Never Used     Tobacco comment: 1 pack per week   Substance Use Topics     Alcohol use: Yes     Comment: OCCASIONALLY     If you drink alcohol do you typically have >3 drinks per day or >7 drinks per week? No    Alcohol Use 2016   Prescreen: >3 drinks/day or >7 drinks/week? The patient does not drink >3 drinks per day nor >7 drinks per week.   No flowsheet data found.    Last PSA: No results found for: PSA    Reviewed orders with patient. Reviewed health maintenance and updated orders accordingly - Yes  Lab work is in process  Labs reviewed in EPIC  BP Readings from Last 3 Encounters:   22 (!) 136/94   21 (!) 142/100   19 (!) 144/96    Wt Readings from Last 3 Encounters:   22 121.1 kg (267 lb)   21 117.9 kg (260 lb)   19 111.1 kg (245 lb)                  Patient Active Problem List   Diagnosis     MEDICAL HISTORY OF -     CARDIOVASCULAR SCREENING; LDL GOAL LESS THAN 160     Chronic anticoagulation     Long term current use of anticoagulant therapy     Poor compliance with medication     Chronic deep vein thrombosis (DVT) of femoral vein of left lower extremity (H)     Cardiomyopathy, unspecified type (H)     Deep vein thrombosis (DVT) (H) [I82.409]     Morbid obesity (H)     Past Surgical History:   Procedure Laterality Date     SURGICAL HISTORY OF -       Multiple procedures for DVT's at the [facility]       Social History     Tobacco Use     Smoking status: Former Smoker     Years: 4.00     Quit date: 2003     Years since quittin.3     Smokeless tobacco: Never Used     Tobacco comment: 1 pack per week   Substance Use Topics     Alcohol use: Yes     Comment: OCCASIONALLY     Family History   Problem Relation Age of Onset     Hypertension Mother      Neurologic Disorder Father      Alzheimer Disease Maternal Grandmother      Heart Disease Paternal Grandfather          Current Outpatient Medications   Medication Sig Dispense  "Refill     losartan (COZAAR) 25 MG tablet Take 1 tablet (25 mg) by mouth daily 90 tablet 1     rivaroxaban ANTICOAGULANT (XARELTO ANTICOAGULANT) 20 MG TABS tablet Take 1 tablet (20 mg) by mouth daily (with dinner) needs follow up appointment with PMD 90 tablet 3     Allergies   Allergen Reactions     Nkda [No Known Drug Allergies]        Reviewed and updated as needed this visit by clinical staff   Tobacco  Allergies  Meds  Problems  Med Hx  Surg Hx  Fam Hx  Soc   Hx          Reviewed and updated as needed this visit by Provider      Problems                  Review of Systems  CONSTITUTIONAL: NEGATIVE for fever, chills, change in weight  INTEGUMENTARY/SKIN: NEGATIVE for worrisome rashes, moles or lesions  EYES: NEGATIVE for vision changes or irritation  ENT: NEGATIVE for ear, mouth and throat problems  RESP: NEGATIVE for significant cough or SOB  CV: NEGATIVE for chest pain, palpitations or peripheral edema  GI: NEGATIVE for nausea, abdominal pain, heartburn, or change in bowel habits   male: negative for dysuria, hematuria, decreased urinary stream, erectile dysfunction, urethral discharge  MUSCULOSKELETAL: NEGATIVE for significant arthralgias or myalgia  NEURO: NEGATIVE for weakness, dizziness or paresthesias  PSYCHIATRIC: NEGATIVE for changes in mood or affect    OBJECTIVE:   BP (!) 132/94   Pulse 104   Temp 98.5  F (36.9  C) (Oral)   Resp 14   Ht 1.772 m (5' 9.75\")   Wt 121.1 kg (267 lb)   SpO2 97%   BMI 38.59 kg/m      Physical Exam  GENERAL: healthy, alert and no distress  EYES: Eyes grossly normal to inspection, PERRL and conjunctivae and sclerae normal  HENT: ear canals and TM's normal, nose and mouth without ulcers or lesions  NECK: no adenopathy, no asymmetry, masses, or scars and thyroid normal to palpation  RESP: lungs clear to auscultation - no rales, rhonchi or wheezes  CV: regular rate and rhythm, normal S1 S2, no S3 or S4, no murmur, click or rub, no peripheral edema and peripheral " pulses strong  ABDOMEN: soft, nontender, no hepatosplenomegaly, no masses and bowel sounds normal  MS: no gross musculoskeletal defects noted, no edema  SKIN: no suspicious lesions or rashes  NEURO: Normal strength and tone, mentation intact and speech normal  PSYCH: mentation appears normal, affect normal/bright    Diagnostic Test Results:  Labs reviewed in Epic  No orders of the defined types were placed in this encounter.        ASSESSMENT/PLAN:   (Z00.00) Routine history and physical examination of adult  (primary encounter diagnosis)  Comment: routine screening issues   Plan: Hemoglobin, Basic metabolic panel  (Ca, Cl,         CO2, Creat, Gluc, K, Na, BUN)        See orders section of this encounter     (Z79.01) Long term current use of anticoagulant therapy  Comment: refills provided, refills provided good for one year   Plan: rivaroxaban ANTICOAGULANT (XARELTO         ANTICOAGULANT) 20 MG TABS tablet, Hemoglobin,         Basic metabolic panel  (Ca, Cl, CO2, Creat,         Gluc, K, Na, BUN)            (I42.9) Cardiomyopathy, unspecified type (H)  Comment: problem is stable and ongoing monitoring    Plan: Hemoglobin, Basic metabolic panel  (Ca, Cl,         CO2, Creat, Gluc, K, Na, BUN)            (Z13.6) CARDIOVASCULAR SCREENING; LDL GOAL LESS THAN 160  Comment: problem is stable and ongoing monitoring    Plan: Lipid panel reflex to direct LDL Non-fasting            (E66.01) Morbid obesity (H)  Comment: Body mass index is 38.59 kg/m .   Plan: An individual is considered morbidly obese if he or she is 100 pounds over his/her ideal body weight, has a BMI of 40 or more, or 35 or more and experiencing obesity-related health conditions, such as high blood pressure or diabetes. Given the fact that he has hypertension he has a medical condition here addressed as much as possible.        (Z23) Need for diphtheria-tetanus-pertussis (Tdap) vaccine  Comment: administered   Plan: TDAP VACCINE (Adacel, Boostrix)   "[[id number]]            (Z71.89) Advanced directives, counseling/discussion  Comment: reviewed   Plan:     (I10) Essential hypertension with goal blood pressure less than 140/90  Comment: new diagnosis today. Medication started. Medical assistant blood pressure recheck  In 3 weeks, otherwise recheck in one year   Plan: losartan (COZAAR) 25 MG tablet            (E04.9) Goiter  Comment: I find his thyroid gland to be at the above the upper limit of the normal range , it's symmetrical and I am not especially concerned about this, but a check of TSH ( thyroid test ) is appropriate   Plan: TSH with free T4 reflex              Patient has been advised of split billing requirements and indicates understanding: Yes    COUNSELING:   Reviewed preventive health counseling, as reflected in patient instructions    Estimated body mass index is 38.59 kg/m  as calculated from the following:    Height as of this encounter: 1.772 m (5' 9.75\").    Weight as of this encounter: 121.1 kg (267 lb).     Weight management plan: Discussed healthy diet and exercise guidelines    He reports that he quit smoking about 19 years ago. He quit after 4.00 years of use. He has never used smokeless tobacco.      Counseling Resources:  ATP IV Guidelines  Pooled Cohorts Equation Calculator  FRAX Risk Assessment  ICSI Preventive Guidelines  Dietary Guidelines for Americans, 2010  USDA's MyPlate  ASA Prophylaxis  Lung CA Screening    Parth Cloud MD  [facility]  "

## 2022-05-19 NOTE — PROGRESS NOTES
Last appointment with me was 2-4-2021    Health Maintenance Due   Topic Date Due     ANNUAL REVIEW OF HM ORDERS  Never done     ADVANCE CARE PLANNING  Never done     PREVENTIVE CARE VISIT  02/04/2017     DTAP/TDAP/TD IMMUNIZATION (10 - Td or Tdap) 12/09/2020     PHQ-2 (once per calendar year)  01/01/2022

## 2022-06-06 ENCOUNTER — ALLIED HEALTH/NURSE VISIT (OUTPATIENT)
Dept: FAMILY MEDICINE | Facility: CLINIC | Age: 42
End: 2022-06-06
Payer: COMMERCIAL

## 2022-06-06 VITALS — DIASTOLIC BLOOD PRESSURE: 82 MMHG | SYSTOLIC BLOOD PRESSURE: 132 MMHG

## 2022-06-06 DIAGNOSIS — Z01.30 BLOOD PRESSURE CHECK: Primary | ICD-10-CM

## 2022-06-06 PROCEDURE — 99207 PR NO CHARGE NURSE ONLY: CPT

## 2022-06-06 NOTE — PROGRESS NOTES
Naman Alvarado arrived for a blood pressure check, his blood pressure reading was: 132/82.    BP Readings from Last 3 Encounters:   05/19/22 (!) 136/94   02/04/21 (!) 142/100   07/08/19 (!) 144/96     BP Goal: <140/90 mmHg    BP Assessment: BP at goal    Encounter routed to provider for review.    Note completed by: Roxanne Olivier MA

## 2022-10-29 ENCOUNTER — HEALTH MAINTENANCE LETTER (OUTPATIENT)
Age: 42
End: 2022-10-29

## 2022-12-15 DIAGNOSIS — I10 ESSENTIAL HYPERTENSION WITH GOAL BLOOD PRESSURE LESS THAN 140/90: ICD-10-CM

## 2022-12-15 RX ORDER — LOSARTAN POTASSIUM 25 MG/1
TABLET ORAL
Qty: 90 TABLET | Refills: 0 | Status: SHIPPED | OUTPATIENT
Start: 2022-12-15 | End: 2023-04-04

## 2023-04-03 DIAGNOSIS — I10 ESSENTIAL HYPERTENSION WITH GOAL BLOOD PRESSURE LESS THAN 140/90: ICD-10-CM

## 2023-04-04 RX ORDER — LOSARTAN POTASSIUM 25 MG/1
25 TABLET ORAL DAILY
Qty: 90 TABLET | Refills: 0 | Status: SHIPPED | OUTPATIENT
Start: 2023-04-04 | End: 2023-08-02

## 2023-05-22 ENCOUNTER — TELEPHONE (OUTPATIENT)
Dept: FAMILY MEDICINE | Facility: CLINIC | Age: 43
End: 2023-05-22

## 2023-05-22 NOTE — LETTER
May 22, 2023    To  Naman Alvarado  [facility] [address]  [place] [address]          Your team at [facility] cares about your health. We have reviewed your chart and based on our findings; we are making the following recommendations to better manage your health.     You are in particular need of attention regarding the following:     PREVENTATIVE VISIT: Physical    If you have already completed these items, please contact the clinic via phone or   MyChart so your care team can review and update your records. Thank you for   choosing [facility] for your healthcare needs. For any questions,   concerns, or to schedule an appointment please contact our clinic.    Healthy Regards,      Your [facility] Care Team

## 2023-06-25 ENCOUNTER — HEALTH MAINTENANCE LETTER (OUTPATIENT)
Age: 43
End: 2023-06-25

## 2023-07-31 DIAGNOSIS — I10 ESSENTIAL HYPERTENSION WITH GOAL BLOOD PRESSURE LESS THAN 140/90: ICD-10-CM

## 2023-07-31 NOTE — TELEPHONE ENCOUNTER
Patient Quality Outreach    Patient is due for the following:   Physical Preventive Adult Physical    Next Steps:   Schedule a Adult Preventative    Type of outreach:    Sent letter.    Next Steps:  Reach out within 90 days via Phone.    Max number of attempts reached: Yes. Will try again in 90 days if patient still on fail list.    Questions for provider review:    None           Kristin Serrato [facility]  Chart routed to none.

## 2023-08-01 DIAGNOSIS — I10 ESSENTIAL HYPERTENSION WITH GOAL BLOOD PRESSURE LESS THAN 140/90: ICD-10-CM

## 2023-08-02 RX ORDER — LOSARTAN POTASSIUM 25 MG/1
TABLET ORAL
Qty: 90 TABLET | Refills: 0 | OUTPATIENT
Start: 2023-08-02

## 2023-08-02 RX ORDER — LOSARTAN POTASSIUM 25 MG/1
TABLET ORAL
Qty: 60 TABLET | Refills: 0 | Status: SHIPPED | OUTPATIENT
Start: 2023-08-02 | End: 2023-11-17

## 2023-08-22 DIAGNOSIS — Z79.01 LONG TERM CURRENT USE OF ANTICOAGULANT THERAPY: ICD-10-CM

## 2023-08-23 RX ORDER — RIVAROXABAN 20 MG/1
TABLET, FILM COATED ORAL
Qty: 60 TABLET | Refills: 0 | Status: SHIPPED | OUTPATIENT
Start: 2023-08-23 | End: 2023-11-17

## 2023-09-22 NOTE — TELEPHONE ENCOUNTER
Patient Quality Outreach    Patient is due for the following:   Physical Preventive Adult Physical    Next Steps:   Patient has upcoming appointment, these items will be addressed at that time.    Type of outreach:    none    Next Steps:  Reach out within 90 days via  none .    Max number of attempts reached: Yes. Will try again in 90 days if patient still on fail list.    Questions for provider review:    None           Kristin Serrato, [facility]  Chart routed to none.

## 2023-11-17 ENCOUNTER — OFFICE VISIT (OUTPATIENT)
Dept: INTERNAL MEDICINE | Facility: CLINIC | Age: 43
End: 2023-11-17
Payer: COMMERCIAL

## 2023-11-17 VITALS
TEMPERATURE: 98.1 F | OXYGEN SATURATION: 97 % | HEART RATE: 105 BPM | SYSTOLIC BLOOD PRESSURE: 137 MMHG | RESPIRATION RATE: 16 BRPM | DIASTOLIC BLOOD PRESSURE: 87 MMHG | WEIGHT: 175 LBS | BODY MASS INDEX: 25.29 KG/M2

## 2023-11-17 DIAGNOSIS — Z13.6 CARDIOVASCULAR SCREENING; LDL GOAL LESS THAN 160: ICD-10-CM

## 2023-11-17 DIAGNOSIS — Z00.00 WELLNESS EXAMINATION: Primary | ICD-10-CM

## 2023-11-17 DIAGNOSIS — Z23 NEED FOR COVID-19 VACCINE: ICD-10-CM

## 2023-11-17 DIAGNOSIS — I10 ESSENTIAL HYPERTENSION WITH GOAL BLOOD PRESSURE LESS THAN 140/90: ICD-10-CM

## 2023-11-17 DIAGNOSIS — Z23 NEEDS FLU SHOT: ICD-10-CM

## 2023-11-17 DIAGNOSIS — Z79.01 LONG TERM CURRENT USE OF ANTICOAGULANT THERAPY: ICD-10-CM

## 2023-11-17 LAB
ANION GAP SERPL CALCULATED.3IONS-SCNC: 13 MMOL/L (ref 7–15)
BUN SERPL-MCNC: 15.4 MG/DL (ref 6–20)
CALCIUM SERPL-MCNC: 9 MG/DL (ref 8.6–10)
CHLORIDE SERPL-SCNC: 105 MMOL/L (ref 98–107)
CHOLEST SERPL-MCNC: 220 MG/DL
CREAT SERPL-MCNC: 0.94 MG/DL (ref 0.67–1.17)
DEPRECATED HCO3 PLAS-SCNC: 24 MMOL/L (ref 22–29)
EGFRCR SERPLBLD CKD-EPI 2021: >90 ML/MIN/1.73M2
GLUCOSE SERPL-MCNC: 116 MG/DL (ref 70–99)
HDLC SERPL-MCNC: 36 MG/DL
HGB BLD-MCNC: 13.7 G/DL (ref 13.3–17.7)
LDLC SERPL CALC-MCNC: 147 MG/DL
NONHDLC SERPL-MCNC: 184 MG/DL
POTASSIUM SERPL-SCNC: 3.9 MMOL/L (ref 3.4–5.3)
SODIUM SERPL-SCNC: 142 MMOL/L (ref 135–145)
TRIGL SERPL-MCNC: 187 MG/DL

## 2023-11-17 PROCEDURE — 99396 PREV VISIT EST AGE 40-64: CPT | Mod: 25 | Performed by: INTERNAL MEDICINE

## 2023-11-17 PROCEDURE — 36415 COLL VENOUS BLD VENIPUNCTURE: CPT | Performed by: INTERNAL MEDICINE

## 2023-11-17 PROCEDURE — 90686 IIV4 VACC NO PRSV 0.5 ML IM: CPT | Performed by: INTERNAL MEDICINE

## 2023-11-17 PROCEDURE — 99214 OFFICE O/P EST MOD 30 MIN: CPT | Mod: 25 | Performed by: INTERNAL MEDICINE

## 2023-11-17 PROCEDURE — 80061 LIPID PANEL: CPT | Performed by: INTERNAL MEDICINE

## 2023-11-17 PROCEDURE — 80048 BASIC METABOLIC PNL TOTAL CA: CPT | Performed by: INTERNAL MEDICINE

## 2023-11-17 PROCEDURE — 90471 IMMUNIZATION ADMIN: CPT | Performed by: INTERNAL MEDICINE

## 2023-11-17 PROCEDURE — 90480 ADMN SARSCOV2 VAC 1/ONLY CMP: CPT | Performed by: INTERNAL MEDICINE

## 2023-11-17 PROCEDURE — 91320 SARSCV2 VAC 30MCG TRS-SUC IM: CPT | Performed by: INTERNAL MEDICINE

## 2023-11-17 PROCEDURE — 85018 HEMOGLOBIN: CPT | Performed by: INTERNAL MEDICINE

## 2023-11-17 RX ORDER — LOSARTAN POTASSIUM 25 MG/1
TABLET ORAL
Qty: 90 TABLET | Refills: 3 | Status: SHIPPED | OUTPATIENT
Start: 2023-11-17

## 2023-11-17 ASSESSMENT — ENCOUNTER SYMPTOMS
CONSTIPATION: 0
CHILLS: 0
COUGH: 0
HEMATOCHEZIA: 0
SORE THROAT: 0
ABDOMINAL PAIN: 0
JOINT SWELLING: 0
HEMATURIA: 0
PARESTHESIAS: 0
FEVER: 0
NERVOUS/ANXIOUS: 0
EYE PAIN: 0
SHORTNESS OF BREATH: 0
MYALGIAS: 0
DYSURIA: 0
PALPITATIONS: 0
NAUSEA: 0
WEAKNESS: 0
HEADACHES: 0
FREQUENCY: 0
DIARRHEA: 0
HEARTBURN: 0
ARTHRALGIAS: 0
DIZZINESS: 0

## 2023-11-17 NOTE — LETTER
November 20, 2023    Naman Alvarado  [address] [place] [address]          Dear ,    We are writing to inform you of your test results.    All of these tests are within acceptable limits , things look good !     The 10-year ASCVD risk score (Mihai JOHNSON, et al., 2019) is: 3.2%     Values used to calculate the score:       Age: 43 years       Sex: Male       Is Non- : No       Diabetic: No       Tobacco smoker: No       Systolic Blood Pressure: 137 mmHg       Is BP treated: Yes       HDL Cholesterol: 36 mg/dL       Total Cholesterol: 220 mg/dL       Resulted Orders   Lipid panel reflex to direct LDL Non-fasting   Result Value Ref Range    Cholesterol 220 (H) <200 mg/dL    Triglycerides 187 (H) <150 mg/dL    Direct Measure HDL 36 (L) >=40 mg/dL    LDL Cholesterol Calculated 147 (H) <=100 mg/dL    Non HDL Cholesterol 184 (H) <130 mg/dL    Narrative    Cholesterol  Desirable:  <200 mg/dL    Triglycerides  Normal:  Less than 150 mg/dL  Borderline High:  150-199 mg/dL  High:  200-499 mg/dL  Very High:  Greater than or equal to 500 mg/dL    Direct Measure HDL  Female:  Greater than or equal to 50 mg/dL   Male:  Greater than or equal to 40 mg/dL    LDL Cholesterol  Desirable:  <100mg/dL  Above Desirable:  100-129 mg/dL   Borderline High:  130-159 mg/dL   High:  160-189 mg/dL   Very High:  >= 190 mg/dL    Non HDL Cholesterol  Desirable:  130 mg/dL  Above Desirable:  130-159 mg/dL  Borderline High:  160-189 mg/dL  High:  190-219 mg/dL  Very High:  Greater than or equal to 220 mg/dL   Basic metabolic panel  (Ca, Cl, CO2, Creat, Gluc, K, Na, BUN)   Result Value Ref Range    Sodium 142 135 - 145 mmol/L      Comment:      Reference intervals for this test were updated on 09/26/2023 to more accurately reflect our healthy population. There may be differences in the flagging of prior results with similar values performed with this method. Interpretation of those prior results can be made in the  context of the updated reference intervals.     Potassium 3.9 3.4 - 5.3 mmol/L    Chloride 105 98 - 107 mmol/L    Carbon Dioxide (CO2) 24 22 - 29 mmol/L    Anion Gap 13 7 - 15 mmol/L    Urea Nitrogen 15.4 6.0 - 20.0 mg/dL    Creatinine 0.94 0.67 - 1.17 mg/dL    GFR Estimate >90 >60 mL/min/1.73m2    Calcium 9.0 8.6 - 10.0 mg/dL    Glucose 116 (H) 70 - 99 mg/dL   Hemoglobin   Result Value Ref Range    Hemoglobin 13.7 13.3 - 17.7 g/dL       If you have any questions or concerns, please call the clinic at the number listed above.       Sincerely,      Parth Cloud MD

## 2023-11-17 NOTE — PROGRESS NOTES
"SUBJECTIVE:   Naman is a 43 year old, presenting for the following:  Physical         No data to display                Healthy Habits:     Getting at least 3 servings of Calcium per day:  NO    Bi-annual eye exam:  Yes    Dental care twice a year:  Yes    Sleep apnea or symptoms of sleep apnea:  None    Diet:  Regular (no restrictions)    Frequency of exercise:  2-3 days/week    Duration of exercise:  15-30 minutes    Taking medications regularly:  Yes    Additional concerns today:  No      Today's PHQ-2 Score:       2023     2:09 PM   PHQ-2 (  Pfizer)   Q1: Little interest or pleasure in doing things 0   Q2: Feeling down, depressed or hopeless 0   PHQ-2 Score 0   Q1: Little interest or pleasure in doing things Not at all   Q2: Feeling down, depressed or hopeless Not at all   PHQ-2 Score 0           Social History     Tobacco Use    Smoking status: Former     Years: 4     Types: Cigarettes     Quit date: 2003     Years since quittin.8    Smokeless tobacco: Never    Tobacco comments:     1 pack per week   Substance Use Topics    Alcohol use: Yes     Comment: OCCASIONALLY         2023     2:09 PM   Alcohol Use   Prescreen: >3 drinks/day or >7 drinks/week? No     Health Maintenance Due   Topic Date Due    ANNUAL REVIEW OF HM ORDERS  Never done    YEARLY PREVENTIVE VISIT  2023    INFLUENZA VACCINE (1) 2023    COVID-19 Vaccine ( -  season) 2023        Last PSA: No results found for: \"PSA\"    Reviewed orders with patient. Reviewed health maintenance and updated orders accordingly - Yes  Lab work is in process  Labs reviewed in EPIC  BP Readings from Last 3 Encounters:   23 137/87   22 132/82   22 (!) 136/94    Wt Readings from Last 3 Encounters:   23 79.4 kg (175 lb)   22 121.1 kg (267 lb)   21 117.9 kg (260 lb)                  Patient Active Problem List   Diagnosis    MEDICAL HISTORY OF -    CARDIOVASCULAR SCREENING; LDL GOAL LESS " THAN 160    Chronic anticoagulation    Long term current use of anticoagulant therapy    Poor compliance with medication    Chronic deep vein thrombosis (DVT) of femoral vein of left lower extremity (H)    Cardiomyopathy, unspecified type (H)    Deep vein thrombosis (DVT) (H) [I82.409]    Morbid obesity (H)     Past Surgical History:   Procedure Laterality Date    SURGICAL HISTORY OF -       Multiple procedures for DVT's at the [facility]       Social History     Tobacco Use    Smoking status: Former     Years: 4     Types: Cigarettes     Quit date: 2003     Years since quittin.8    Smokeless tobacco: Never    Tobacco comments:     1 pack per week   Substance Use Topics    Alcohol use: Yes     Comment: OCCASIONALLY     Family History   Problem Relation Age of Onset    Hypertension Mother     Neurologic Disorder Father     Alzheimer Disease Maternal Grandmother     Heart Disease Paternal Grandfather          Current Outpatient Medications   Medication Sig Dispense Refill    losartan (COZAAR) 25 MG tablet TAKE 1 TABLET BY MOUTH DAILY. NEEDS APPOINTMENT 90 tablet 3    rivaroxaban ANTICOAGULANT (XARELTO ANTICOAGULANT) 20 MG TABS tablet TAKE 1 TABLET(20 MG) BY MOUTH DAILY WITH DINNER. 90 tablet 3     Allergies   Allergen Reactions    Nkda [No Known Drug Allergy]      Recent Labs   Lab Test 23  1517 22  1451 19  1646 12/15/17  1248   * 167* 197* 167*   HDL 36* 47 46 52   TRIG 187* 119 135 88   ALT  --   --  54 40   CR 0.94 0.92 0.99 0.96   GFRESTIMATED >90 >90 >90 89   GFRESTBLACK  --   --  >90 >90   POTASSIUM 3.9 3.9 4.2 3.8   TSH  --  1.82  --   --         Reviewed and updated as needed this visit by clinical staff   Tobacco  Allergies  Meds              Reviewed and updated as needed this visit by Provider                   Past Medical History:   Diagnosis Date    Cardiomyopathy (H) dx     unknown etiology    Chronic anticoagulation     HIGH RISK PATIENT-Do not stop  warfarin    Dvt femoral (deep venous thrombosis) (H) 9/09    LEFT    MEDICAL HISTORY OF -     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   HENT:  Negative for congestion, ear pain, hearing loss and sore throat.    Eyes:  Negative for pain and visual disturbance.   Respiratory:  Negative for cough and shortness of breath.    Cardiovascular:  Negative for chest pain, palpitations and peripheral edema.   Gastrointestinal:  Negative for abdominal pain, constipation, diarrhea, heartburn, hematochezia and nausea.   Genitourinary:  Negative for dysuria, frequency, genital sores, hematuria, impotence, penile discharge and urgency.   Musculoskeletal:  Negative for arthralgias, joint swelling and myalgias.   Skin:  Negative for rash.   Neurological:  Negative for dizziness, weakness, headaches and paresthesias.   Psychiatric/Behavioral:  Negative for mood changes. The patient is not nervous/anxious.      CONSTITUTIONAL: NEGATIVE for fever, chills, change in weight  INTEGUMENTARY/SKIN: NEGATIVE for worrisome rashes, moles or lesions  EYES: NEGATIVE for vision changes or irritation  ENT: NEGATIVE for ear, mouth and throat problems  RESP: NEGATIVE for significant cough or SOB  CV: NEGATIVE for chest pain, palpitations or peripheral edema  GI: NEGATIVE for nausea, abdominal pain, heartburn, or change in bowel habits   male: negative for dysuria, hematuria, decreased urinary stream, erectile dysfunction, urethral discharge  MUSCULOSKELETAL: NEGATIVE for significant arthralgias or myalgia  NEURO: NEGATIVE for weakness, dizziness or paresthesias  PSYCHIATRIC: NEGATIVE for changes in  mood or affect    OBJECTIVE:   Wt 79.4 kg (175 lb)   BMI 25.29 kg/m      Wt Readings from Last 5 Encounters:   11/17/23 79.4 kg (175 lb)   05/19/22 121.1 kg (267 lb)   02/04/21 117.9 kg (260 lb)   07/08/19 111.1 kg (245 lb)   01/05/18 108 kg (238 lb 3.2 oz)           Physical Exam  GENERAL: healthy, alert and no distress  EYES: Eyes grossly normal to inspection, PERRL and conjunctivae and sclerae normal  HENT: ear canals and TM's normal, nose and mouth without ulcers or lesions  NECK: no adenopathy, no asymmetry, masses, or scars and thyroid normal to palpation  RESP: lungs clear to auscultation - no rales, rhonchi or wheezes  CV: regular rate and rhythm, normal S1 S2, no S3 or S4, no murmur, click or rub, no peripheral edema and peripheral pulses strong  ABDOMEN: soft, nontender, no hepatosplenomegaly, no masses and bowel sounds normal  MS: no gross musculoskeletal defects noted, no edema  SKIN: no suspicious lesions or rashes  NEURO: Normal strength and tone, mentation intact and speech normal  PSYCH: mentation appears normal, affect normal/bright    Diagnostic Test Results:  Labs reviewed in Epic  Orders Placed This Encounter   Procedures    REVIEW OF HEALTH MAINTENANCE PROTOCOL ORDERS    COVID-19 12+ (2023-24) (PFIZER)    INFLUENZA VACCINE >6 MONTHS (AFLURIA/FLUZONE)    Lipid panel reflex to direct LDL Non-fasting    Basic metabolic panel  (Ca, Cl, CO2, Creat, Gluc, K, Na, BUN)    Hemoglobin         ASSESSMENT/PLAN:   (Z00.00) Wellness examination  (primary encounter diagnosis)  Comment: routine screening issues   Plan: REVIEW OF HEALTH MAINTENANCE PROTOCOL ORDERS        See orders section of this encounter     (I10) Essential hypertension with goal blood pressure less than 140/90  Comment: controlled within acceptable limits , problem is stable and ongoing monitoring    Plan: losartan (COZAAR) 25 MG tablet, Basic metabolic        panel  (Ca, Cl, CO2, Creat, Gluc, K, Na, BUN),         Hemoglobin             (Z79.01) Long term current use of anticoagulant therapy  Comment: problem is stable and ongoing monitoring    Plan: rivaroxaban ANTICOAGULANT (XARELTO         ANTICOAGULANT) 20 MG TABS tablet      (Z23) Needs flu shot  Comment: declines   Plan: REVIEW OF HEALTH MAINTENANCE PROTOCOL ORDERS,         CANCELED: INFLUENZA VACCINE 18-64Y (FLUBLOK)            (Z23) Need for COVID-19 vaccine  Comment: administered   Plan: REVIEW OF HEALTH MAINTENANCE PROTOCOL ORDERS,         COVID-19 12+ (2023-24) (PFIZER)            (Z13.6) CARDIOVASCULAR SCREENING; LDL GOAL LESS THAN 160  Comment: routine screening issue, follow up as indicated on results  Plan: Lipid panel reflex to direct LDL Non-fasting            Patient has been advised of split billing requirements and indicates understanding: Yes      COUNSELING:   Reviewed preventive health counseling, as reflected in patient instructions        He reports that he quit smoking about 20 years ago. He has never used smokeless tobacco.            Parth Cloud MD  [facility]

## 2024-12-28 ENCOUNTER — HEALTH MAINTENANCE LETTER (OUTPATIENT)
Age: 44
End: 2024-12-28

## 2025-02-27 DIAGNOSIS — I10 ESSENTIAL HYPERTENSION WITH GOAL BLOOD PRESSURE LESS THAN 140/90: ICD-10-CM

## 2025-02-27 DIAGNOSIS — Z79.01 LONG TERM CURRENT USE OF ANTICOAGULANT THERAPY: ICD-10-CM

## 2025-02-27 RX ORDER — LOSARTAN POTASSIUM 25 MG/1
TABLET ORAL
Qty: 60 TABLET | Refills: 0 | Status: SHIPPED | OUTPATIENT
Start: 2025-02-27

## 2025-03-20 ENCOUNTER — OFFICE VISIT (OUTPATIENT)
Dept: INTERNAL MEDICINE | Facility: CLINIC | Age: 45
End: 2025-03-20
Payer: COMMERCIAL

## 2025-03-20 VITALS
BODY MASS INDEX: 40.43 KG/M2 | HEART RATE: 95 BPM | DIASTOLIC BLOOD PRESSURE: 84 MMHG | TEMPERATURE: 97.4 F | WEIGHT: 282.4 LBS | SYSTOLIC BLOOD PRESSURE: 119 MMHG | OXYGEN SATURATION: 100 % | HEIGHT: 70 IN | RESPIRATION RATE: 16 BRPM

## 2025-03-20 DIAGNOSIS — R73.09 ELEVATED GLUCOSE: ICD-10-CM

## 2025-03-20 DIAGNOSIS — Z79.01 LONG TERM CURRENT USE OF ANTICOAGULANT THERAPY: ICD-10-CM

## 2025-03-20 DIAGNOSIS — Z23 NEEDS FLU SHOT: ICD-10-CM

## 2025-03-20 DIAGNOSIS — K42.9 UMBILICAL HERNIA WITHOUT OBSTRUCTION AND WITHOUT GANGRENE: ICD-10-CM

## 2025-03-20 DIAGNOSIS — Z23 NEED FOR COVID-19 VACCINE: ICD-10-CM

## 2025-03-20 DIAGNOSIS — Z00.00 ROUTINE GENERAL MEDICAL EXAMINATION AT A HEALTH CARE FACILITY: Primary | ICD-10-CM

## 2025-03-20 DIAGNOSIS — I42.9 CARDIOMYOPATHY, UNSPECIFIED TYPE (H): ICD-10-CM

## 2025-03-20 DIAGNOSIS — I10 ESSENTIAL HYPERTENSION WITH GOAL BLOOD PRESSURE LESS THAN 140/90: ICD-10-CM

## 2025-03-20 LAB
EST. AVERAGE GLUCOSE BLD GHB EST-MCNC: 120 MG/DL
HBA1C MFR BLD: 5.8 % (ref 0–5.6)

## 2025-03-20 RX ORDER — LOSARTAN POTASSIUM 25 MG/1
TABLET ORAL
Qty: 90 TABLET | Refills: 3 | Status: SHIPPED | OUTPATIENT
Start: 2025-03-20

## 2025-03-20 SDOH — HEALTH STABILITY: PHYSICAL HEALTH: ON AVERAGE, HOW MANY DAYS PER WEEK DO YOU ENGAGE IN MODERATE TO STRENUOUS EXERCISE (LIKE A BRISK WALK)?: 4 DAYS

## 2025-03-20 ASSESSMENT — SOCIAL DETERMINANTS OF HEALTH (SDOH): HOW OFTEN DO YOU GET TOGETHER WITH FRIENDS OR RELATIVES?: THREE TIMES A WEEK

## 2025-03-20 NOTE — PROGRESS NOTES
"Preventive Care Visit  [facility] KEVAN Cloud MD, Internal Medicine  Mar 20, 2025      Assessment & Plan     (Z00.00) Routine general medical examination at a health care facility  (primary encounter diagnosis)  Comment: Routine following medical management.  Plan: PRIMARY CARE FOLLOW-UP SCHEDULING    (I10) Essential hypertension with goal blood pressure less than 140/90  Comment: Patient reports taking blood pressure medication consistently. His BP was 119/84 in clinic today. In light of use of ARB, will assess renal function and potassium level.   Plan: BASIC METABOLIC PANEL, losartan (COZAAR) 25 MG         tablet    Z79.01) Long term current use of anticoagulant therapy  (I42.9) Cardiomyopathy, unspecified type (H)  Comment: Patient reports taking medication consistently. Per chart review and discussion with patient, we was found to have an abnormal inferior vena caval circulation in 2003 with bilateral lower extremity DVT. He has continued to do well since, and no longer follows with cardiology, but continues on xarelto.  Plan: rivaroxaban ANTICOAGULANT (XARELTO         ANTICOAGULANT) 20 MG TABS tablet    (K42.9) Umbilical hernia without obstruction and without gangrene  Comment: Considering patient reports enlargement of hernia, will consult general surgery for further evaluation.   Plan: Adult Gen Surg  Referral    (Z23) Needs flu shot  (Z23) Need for COVID-19 vaccine  Comment: Pt agreeable to these today.  Plan: INFLUENZA VACCINE,SPLIT         VIRUS,TRIVALENT,PF(FLUZONE)  COVID-19 12+ (PFIZER)    (R73.09) Elevated glucose  Comment: Chart review demonstrated elevated blood sugar of 116 in 2024.    Plan: Hemoglobin A1c      Patient has been advised of split billing requirements and indicates understanding: Yes      BMI  Estimated body mass index is 40.81 kg/m  as calculated from the following:    Height as of this encounter: 1.772 m (5' 9.75\").    Weight as of this encounter: " 128.1 kg (282 lb 6.4 oz).   Weight management plan: Discussed healthy diet and exercise guidelines    Counseling  Appropriate preventive services were addressed with this patient via screening, questionnaire, or discussion as appropriate for fall prevention, nutrition, physical activity, Tobacco-use cessation, social engagement, weight loss and cognition.  Checklist reviewing preventive services available has been given to the patient.  Reviewed patient's diet, addressing concerns and/or questions.       Navjot Florence is a 44 year old, presenting for the following:  Physical (Hernia , would like referral /)        3/20/2025     3:47 PM   Additional Questions   Roomed by giuseppe Beasley Readings from Last 5 Encounters:   03/20/25 128.1 kg (282 lb 6.4 oz)   11/17/23 79.4 kg (175 lb)   05/19/22 121.1 kg (267 lb)   02/04/21 117.9 kg (260 lb)   07/08/19 111.1 kg (245 lb)      HPI  Patient reports he feels he is doing well, no acute concerns beyond umbilical hernia, wondering if can get taken care of. First noticed in 2022. Patient reports no pain with hernia and that it is compressible. Does not about a month ago he felt a little constipated, but this has since resolved.      Advance Care Planning  Patient does not have a Health Care Directive: Discussed advance care planning with patient; however, patient declined at this time.      3/20/2025   General Health   How would you rate your overall physical health? Good   Feel stress (tense, anxious, or unable to sleep) Only a little   (!) STRESS CONCERN- overall feeling good.       3/20/2025   Nutrition   Three or more servings of calcium each day? Yes   Diet: Regular (no restrictions)   How many servings of fruit and vegetables per day? (!) 0-1   How many sweetened beverages each day? 0-1   Eats carrots          3/20/2025   Exercise   Days per week of moderate/strenous exercise 4 days         3/20/2025   Social Factors   Frequency of gathering with friends or relatives  Three times a week   Worry food won't last until get money to buy more No   Food not last or not have enough money for food? No   Do you have housing? (Housing is defined as stable permanent housing and does not include staying ouside in a car, in a tent, in an abandoned building, in an overnight shelter, or couch-surfing.) Yes   Are you worried about losing your housing? No   Lack of transportation? No   Unable to get utilities (heat,electricity)? No         3/20/2025   Dental   Dentist two times every year? Yes           Today's PHQ-2 Score:       3/20/2025     3:38 PM   PHQ-2 (  [employer])   Q1: Little interest or pleasure in doing things 0   Q2: Feeling down, depressed or hopeless 0   PHQ-2 Score 0    Q1: Little interest or pleasure in doing things Not at all   Q2: Feeling down, depressed or hopeless Not at all   PHQ-2 Score 0       Patient-reported           3/20/2025   Substance Use   Alcohol more than 3/day or more than 7/wk No   Do you use any other substances recreationally? No     Social History     Tobacco Use    Smoking status: Former     Current packs/day: 0.00     Types: Cigarettes     Start date: 1999     Quit date: 2003     Years since quittin.2    Smokeless tobacco: Never    Tobacco comments:     1 pack per week   Substance Use Topics    Alcohol use: Yes     Comment: OCCASIONALLY    Drug use: No           3/20/2025   STI Screening   New sexual partner(s) since last STI/HIV test? No   ASCVD Risk   The 10-year ASCVD risk score (Mihai JOHNSON, et al., 2019) is: 2.7%    Values used to calculate the score:      Age: 44 years      Sex: Male      Is Non- : No      Diabetic: No      Tobacco smoker: No      Systolic Blood Pressure: 119 mmHg      Is BP treated: Yes      HDL Cholesterol: 36 mg/dL      Total Cholesterol: 220 mg/dL        3/20/2025   Contraception/Family Planning   Questions about contraception or family planning No        Reviewed and updated as needed  this visit by Provider                    Past Medical History:   Diagnosis Date    Cardiomyopathy (H) dx     unknown etiology    Chronic anticoagulation     HIGH RISK PATIENT-Do not stop warfarin    Dvt femoral (deep venous thrombosis) (H)     LEFT    MEDICAL HISTORY OF -     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    Poor compliance with medication     Warfarin / poor INR compliance     Past Surgical History:   Procedure Laterality Date    SURGICAL HISTORY OF -       Multiple procedures for DVT's at the [facility]     BP Readings from Last 3 Encounters:   25 119/84   23 137/87   22 132/82    Wt Readings from Last 3 Encounters:   25 128.1 kg (282 lb 6.4 oz)   23 79.4 kg (175 lb)   22 121.1 kg (267 lb)                  Patient Active Problem List   Diagnosis    MEDICAL HISTORY OF -    CARDIOVASCULAR SCREENING; LDL GOAL LESS THAN 160    Chronic anticoagulation    Long term current use of anticoagulant therapy    Poor compliance with medication    Chronic deep vein thrombosis (DVT) of femoral vein of left lower extremity (H)    Cardiomyopathy, unspecified type (H)    Deep vein thrombosis (DVT) (H) [I82.409]    Morbid obesity (H)     Past Surgical History:   Procedure Laterality Date    SURGICAL HISTORY OF -       Multiple procedures for DVT's at the [facility]       Social History     Tobacco Use    Smoking status: Former     Current packs/day: 0.00     Types: Cigarettes     Start date: 1999     Quit date: 2003     Years since quittin.2    Smokeless tobacco: Never    Tobacco comments:     1 pack per week   Substance Use Topics    Alcohol use: Yes     Comment: OCCASIONALLY     Family History   Problem Relation Age of Onset    Hypertension Mother     Neurologic Disorder Father     Alzheimer Disease Maternal Grandmother     Heart Disease Paternal Grandfather          Current  "Outpatient Medications   Medication Sig Dispense Refill    losartan (COZAAR) 25 MG tablet TAKE 1 TABLET BY MOUTH DAILY. NEEDS APPOINTMENT 60 tablet 0    rivaroxaban ANTICOAGULANT (XARELTO ANTICOAGULANT) 20 MG TABS tablet TAKE 1 TABLET(20 MG) BY MOUTH DAILY WITH DINNER. 60 tablet 0     Allergies   Allergen Reactions    Nkda [No Known Drug Allergy]      Recent Labs   Lab Test 11/17/23  1517 05/19/22  1451 07/08/19  1646 12/15/17  1248   * 167* 197* 167*   HDL 36* 47 46 52   TRIG 187* 119 135 88   ALT  --   --  54 40   CR 0.94 0.92 0.99 0.96   GFRESTIMATED >90 >90 >90 89   GFRESTBLACK  --   --  >90 >90   POTASSIUM 3.9 3.9 4.2 3.8   TSH  --  1.82  --   --       The 10-year ASCVD risk score (Mihai JOHNSON, et al., 2019) is: 2.7%    Values used to calculate the score:      Age: 44 years      Sex: Male      Is Non- : No      Diabetic: No      Tobacco smoker: No      Systolic Blood Pressure: 119 mmHg      Is BP treated: Yes      HDL Cholesterol: 36 mg/dL      Total Cholesterol: 220 mg/dL      Review of Systems  Constitutional, HEENT, cardiovascular, pulmonary, gi and gu systems are negative, except as otherwise noted.     Objective    Exam  /84   Pulse 95   Temp 97.4  F (36.3  C) (Temporal)   Resp 16   Ht 1.772 m (5' 9.75\")   Wt 128.1 kg (282 lb 6.4 oz)   SpO2 100%   BMI 40.81 kg/m     Estimated body mass index is 40.81 kg/m  as calculated from the following:    Height as of this encounter: 1.772 m (5' 9.75\").    Weight as of this encounter: 128.1 kg (282 lb 6.4 oz).    Physical Exam  GENERAL: alert and no distress  HENT: normal cephalic/atraumatic, right ear: unable to visualize TM due to cerumen in place, left ear: normal: no effusions, no erythema, normal landmarks, nose and mouth without ulcers or lesions, oropharynx clear, and oral mucous membranes moist  NECK: no adenopathy, no asymmetry, masses, or scars, and thyroid normal to palpation  RESP: lungs clear to auscultation - no " rales, rhonchi or wheezes  CV: regular rate and rhythm, normal S1 S2, no S3 or S4, no murmur, click or rub, no peripheral edema  ABDOMEN: soft, nontender, bowel sounds normal, and hernia approximately 8 cm x 5 cm umbilical hernia at 11 o'clock position that is compressible, soft, and non tender.  MS: no gross musculoskeletal defects noted, no edema  PSYCH: mentation appears normal, affect normal/bright    Angelito Snowden DNP Student 3/20/2025 4:34 PM    This patient office visit today was staffed with me, I did review the entire clinical presentation and history, exam and medical decision making with [employer] student Angelito Snowden. I agree with and have approved the office visit entirely. Patient seen with me and DARREL Snowden.together today. I agree with assessment and plans.      Signed Electronically by: Parth Cloud MD

## 2025-03-20 NOTE — PATIENT INSTRUCTIONS
Patient Education   Preventive Care Advice   This is general advice given by our system to help you stay healthy. However, your care team may have specific advice just for you. Please talk to your care team about your preventive care needs.  Nutrition  Eat 5 or more servings of fruits and vegetables each day.  Try wheat bread, brown rice and whole grain pasta (instead of white bread, rice, and pasta).  Get enough calcium and vitamin D. Check the label on foods and aim for 100% of the RDA (recommended daily allowance).  Lifestyle  Exercise at least 150 minutes each week  (30 minutes a day, 5 days a week).  Do muscle strengthening activities 2 days a week. These help control your weight and prevent disease.  No smoking.  Wear sunscreen to prevent skin cancer.  Have a dental exam and cleaning every 6 months.  Yearly exams  See your health care team every year to talk about:  Any changes in your health.  Any medicines your care team has prescribed.  Preventive care, family planning, and ways to prevent chronic diseases.  Shots (vaccines)   HPV shots (up to age 26), if you've never had them before.  Hepatitis B shots (up to age 59), if you've never had them before.  COVID-19 shot: Get this shot when it's due.  Flu shot: Get a flu shot every year.  Tetanus shot: Get a tetanus shot every 10 years.  Pneumococcal, hepatitis A, and RSV shots: Ask your care team if you need these based on your risk.  Shingles shot (for age 50 and up)  General health tests  Diabetes screening:  Starting at age 35, Get screened for diabetes at least every 3 years.  If you are younger than age 35, ask your care team if you should be screened for diabetes.  Cholesterol test: At age 39, start having a cholesterol test every 5 years, or more often if advised.  Bone density scan (DEXA): At age 50, ask your care team if you should have this scan for osteoporosis (brittle bones).  Hepatitis C: Get tested at least once in your life.  STIs (sexually  transmitted infections)  Before age 24: Ask your care team if you should be screened for STIs.  After age 24: Get screened for STIs if you're at risk. You are at risk for STIs (including HIV) if:  You are sexually active with more than one person.  You don't use condoms every time.  You or a partner was diagnosed with a sexually transmitted infection.  If you are at risk for HIV, ask about PrEP medicine to prevent HIV.  Get tested for HIV at least once in your life, whether you are at risk for HIV or not.  Cancer screening tests  Cervical cancer screening: If you have a cervix, begin getting regular cervical cancer screening tests starting at age 21.  Breast cancer scan (mammogram): If you've ever had breasts, begin having regular mammograms starting at age 40. This is a scan to check for breast cancer.  Colon cancer screening: It is important to start screening for colon cancer at age 45.  Have a colonoscopy test every 10 years (or more often if you're at risk) Or, ask your provider about stool tests like a FIT test every year or Cologuard test every 3 years.  To learn more about your testing options, visit:   .  For help making a decision, visit:   https://bit.ly/ce52493.  Prostate cancer screening test: If you have a prostate, ask your care team if a prostate cancer screening test (PSA) at age 55 is right for you.  Lung cancer screening: If you are a current or former smoker ages 50 to 80, ask your care team if ongoing lung cancer screenings are right for you.  For informational purposes only. Not to replace the advice of your health care provider. Copyright   2023 [employer]". All rights reserved. Clinically reviewed by the [facility] Transitions Program. [employer] 318780 - REV 01/24.

## 2025-03-25 NOTE — TELEPHONE ENCOUNTER
REFERRAL INFORMATION:  Referring Provider:  Parth Cloud MD   Referring Clinic:   INTERNAL MEDICINE   Reason for Visit/Diagnosis: K42.9 (ICD-10-CM) - Umbilical hernia without obstruction and without gangrene        FUTURE VISIT INFORMATION:  Appointment Date: 4/3/25  Appointment Time:      NOTES RECORD STATUS  DETAILS   OFFICE NOTE from Referring Provider Internal 3/20/25   PERTINENT LABS Internal      Records Requested    Facility    Fax:    Outcome

## 2025-04-02 ENCOUNTER — PRE VISIT (OUTPATIENT)
Dept: SURGERY | Facility: CLINIC | Age: 45
End: 2025-04-02
Payer: COMMERCIAL

## 2025-04-02 NOTE — TELEPHONE ENCOUNTER
Patient Telephone Reminder Call    Date of call:  04/02/25  Phone numbers:  Home number on file [phone] (home)    Reached patient/confirmed appointment:  Yes  Appointment with:   Dr. Merrick Henao  Reason for visit:  Umbilical hernia  Remind patient that this visit is a consultation only, NO procedure:  Yes  Can this visit be changed to a video visit:  No                       retired

## 2025-04-03 ENCOUNTER — OFFICE VISIT (OUTPATIENT)
Dept: SURGERY | Facility: CLINIC | Age: 45
End: 2025-04-03
Attending: INTERNAL MEDICINE
Payer: COMMERCIAL

## 2025-04-03 ENCOUNTER — PRE VISIT (OUTPATIENT)
Dept: SURGERY | Facility: CLINIC | Age: 45
End: 2025-04-03

## 2025-04-03 VITALS
SYSTOLIC BLOOD PRESSURE: 134 MMHG | WEIGHT: 286.8 LBS | OXYGEN SATURATION: 97 % | HEIGHT: 70 IN | HEART RATE: 112 BPM | BODY MASS INDEX: 41.06 KG/M2 | DIASTOLIC BLOOD PRESSURE: 97 MMHG

## 2025-04-03 DIAGNOSIS — K42.9 UMBILICAL HERNIA WITHOUT OBSTRUCTION AND WITHOUT GANGRENE: ICD-10-CM

## 2025-04-03 ASSESSMENT — PAIN SCALES - GENERAL: PAINLEVEL_OUTOF10: NO PAIN (0)

## 2025-04-03 NOTE — LETTER
"4/3/2025       RE: Naman Alvarado  [address]  [place] [facility] [address]     Dear Colleague,    Thank you for referring your patient, Naman Alvarado, to the [facility] [place] [facility]. Please see a copy of my visit note below.    Naman Alvarado is a 44 year old male with a many year history of an umbilical hernia with the following symptoms of lump. Noticed it getting bigger.     Onset did occur with lifting.  Obstructive symptoms:  no  Urinary difficulties:  no  Chronic cough: no  Constipation:  no  Current level of activity:  works as , used to work out more.    Past medical history, medications, allergies, family history, and social history were reviewed with the patient.    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  Skin- Skin color and turgor normal.  No obvious rashes.  Lungs- Respiratory effort unlabored.  Gait- Normal.  BMI 41  Abdomen - soft non distended, non tender fair sized umbilical hernia reducible.    Impression: umbilical hernia in obese patient.  Recommend: watchful waiting until can maintain weight loss to goal.  Will see me again when closer to goal.  To use abdominal binder while in weight loss program.    \"Total time = 30 minutes, spent on the " date of encounter doing chart review, history and physical, documentation, patient education, and any further activity as noted above.   .      Again, thank you for allowing me to participate in the care of your patient.      Sincerely,    Merrick Henao MD

## 2025-04-03 NOTE — PATIENT INSTRUCTIONS
You met with Dr. Merrick Henao.      Today's visit instructions:    Dr. Merrick Henao would like you to quit smoking/quit vaping and lose weight to goal weight of 230 pounds (56 pound weight loss) prior to considering hernia surgery.  Please return to the clinic when you get close to this goal. Please call [phone] to arrange an appointment.  Recommending that you wear an abdominal binder or exercise belt at work.  If you would like a referral for Medical Weight Management or smoking cessation please contact our office at [phone].   1.  Weight loss resource:  Medical Weight Management: [phone]  If you have questions please contact Anahi RN or Li RN during regular clinic hours, Monday through Friday 7:30 AM - 4:00 PM, or you can contact us via [employer] at anytime.       If you have urgent needs after-hours, weekends, or holidays please call the hospital at [phone] and ask to speak with our on-call General Surgery Team.    Appointment [phone]  Nurse Advice ([facility] or [facility]): [phone]   Surgery Scheduler ([facility]): [phone]  Billing Customer Service:  [phone]  Fax: [phone]

## 2025-04-03 NOTE — NURSING NOTE
"Chief Complaint   Patient presents with    New Patient     Umbilical hernia       Vitals:    04/03/25 1320   BP: (!) 134/97   BP Location: Left arm   Patient Position: Sitting   Cuff Size: Adult Regular   Pulse: 112   SpO2: 97%   Weight: 130.1 kg (286 lb 12.8 oz)   Height: 1.772 m (5' 9.75\")       Body mass index is 41.45 kg/m .                          El Sheppard, EMT    "

## 2025-04-03 NOTE — PROGRESS NOTES
"Naman Alvarado is a 44 year old male with a many year history of an umbilical hernia with the following symptoms of lump. Noticed it getting bigger.     Onset did occur with lifting.  Obstructive symptoms:  no  Urinary difficulties:  no  Chronic cough: no  Constipation:  no  Current level of activity:  works as , used to work out more.    Past medical history, medications, allergies, family history, and social history were reviewed with the patient.    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  Skin- Skin color and turgor normal.  No obvious rashes.  Lungs- Respiratory effort unlabored.  Gait- Normal.  BMI 41  Abdomen - soft non distended, non tender fair sized umbilical hernia reducible.    Impression: umbilical hernia in obese patient.  Recommend: watchful waiting until can maintain weight loss to goal.  Will see me again when closer to goal.  To use abdominal binder while in weight loss program.    \"Total time = 30 minutes, spent on the date of encounter doing chart review, history and physical, documentation, patient education, and any further activity as noted above.   .    "

## 2025-07-02 NOTE — TELEPHONE ENCOUNTER
Left message for patient that INR appointment today was cancelled due to being discharged from [facility] in March 2019 for noncompliance. Advised needs to schedule appointment with PCP for follow up with INR's and to call [phone] to schedule a follow up with his PCP.    Emilia Hernandez RN - BC      
2